# Patient Record
Sex: FEMALE | Race: ASIAN | NOT HISPANIC OR LATINO | URBAN - METROPOLITAN AREA
[De-identification: names, ages, dates, MRNs, and addresses within clinical notes are randomized per-mention and may not be internally consistent; named-entity substitution may affect disease eponyms.]

---

## 2019-09-06 ENCOUNTER — INPATIENT (INPATIENT)
Facility: HOSPITAL | Age: 79
LOS: 2 days | Discharge: HOME | End: 2019-09-09
Attending: INTERNAL MEDICINE | Admitting: INTERNAL MEDICINE
Payer: MEDICARE

## 2019-09-06 VITALS
RESPIRATION RATE: 18 BRPM | HEIGHT: 63 IN | OXYGEN SATURATION: 95 % | TEMPERATURE: 98 F | SYSTOLIC BLOOD PRESSURE: 140 MMHG | DIASTOLIC BLOOD PRESSURE: 92 MMHG | HEART RATE: 81 BPM | WEIGHT: 139.99 LBS

## 2019-09-06 DIAGNOSIS — E11.9 TYPE 2 DIABETES MELLITUS WITHOUT COMPLICATIONS: ICD-10-CM

## 2019-09-06 DIAGNOSIS — E78.5 HYPERLIPIDEMIA, UNSPECIFIED: ICD-10-CM

## 2019-09-06 DIAGNOSIS — Z29.9 ENCOUNTER FOR PROPHYLACTIC MEASURES, UNSPECIFIED: ICD-10-CM

## 2019-09-06 DIAGNOSIS — I10 ESSENTIAL (PRIMARY) HYPERTENSION: ICD-10-CM

## 2019-09-06 DIAGNOSIS — R07.9 CHEST PAIN, UNSPECIFIED: ICD-10-CM

## 2019-09-06 LAB
ALBUMIN SERPL ELPH-MCNC: 3.8 G/DL — SIGNIFICANT CHANGE UP (ref 3.3–5)
ALP SERPL-CCNC: 51 U/L — SIGNIFICANT CHANGE UP (ref 40–120)
ALT FLD-CCNC: 25 U/L — SIGNIFICANT CHANGE UP (ref 12–78)
ANION GAP SERPL CALC-SCNC: 10 MMOL/L — SIGNIFICANT CHANGE UP (ref 5–17)
APTT BLD: 27.7 SEC — SIGNIFICANT CHANGE UP (ref 27.5–36.3)
AST SERPL-CCNC: 18 U/L — SIGNIFICANT CHANGE UP (ref 15–37)
BASOPHILS # BLD AUTO: 0.02 K/UL — SIGNIFICANT CHANGE UP (ref 0–0.2)
BASOPHILS NFR BLD AUTO: 0.3 % — SIGNIFICANT CHANGE UP (ref 0–2)
BILIRUB SERPL-MCNC: 0.3 MG/DL — SIGNIFICANT CHANGE UP (ref 0.2–1.2)
BUN SERPL-MCNC: 11 MG/DL — SIGNIFICANT CHANGE UP (ref 7–23)
CALCIUM SERPL-MCNC: 8.7 MG/DL — SIGNIFICANT CHANGE UP (ref 8.5–10.1)
CHLORIDE SERPL-SCNC: 108 MMOL/L — SIGNIFICANT CHANGE UP (ref 96–108)
CO2 SERPL-SCNC: 25 MMOL/L — SIGNIFICANT CHANGE UP (ref 22–31)
CREAT SERPL-MCNC: 0.79 MG/DL — SIGNIFICANT CHANGE UP (ref 0.5–1.3)
EOSINOPHIL # BLD AUTO: 0.09 K/UL — SIGNIFICANT CHANGE UP (ref 0–0.5)
EOSINOPHIL NFR BLD AUTO: 1.4 % — SIGNIFICANT CHANGE UP (ref 0–6)
GLUCOSE BLDC GLUCOMTR-MCNC: 126 MG/DL — HIGH (ref 70–99)
GLUCOSE BLDC GLUCOMTR-MCNC: 138 MG/DL — HIGH (ref 70–99)
GLUCOSE BLDC GLUCOMTR-MCNC: 156 MG/DL — HIGH (ref 70–99)
GLUCOSE BLDC GLUCOMTR-MCNC: 272 MG/DL — HIGH (ref 70–99)
GLUCOSE SERPL-MCNC: 150 MG/DL — HIGH (ref 70–99)
HCT VFR BLD CALC: 36 % — SIGNIFICANT CHANGE UP (ref 34.5–45)
HGB BLD-MCNC: 10.9 G/DL — LOW (ref 11.5–15.5)
IMM GRANULOCYTES NFR BLD AUTO: 0.2 % — SIGNIFICANT CHANGE UP (ref 0–1.5)
INR BLD: 0.93 RATIO — SIGNIFICANT CHANGE UP (ref 0.88–1.16)
LYMPHOCYTES # BLD AUTO: 1.76 K/UL — SIGNIFICANT CHANGE UP (ref 1–3.3)
LYMPHOCYTES # BLD AUTO: 27.5 % — SIGNIFICANT CHANGE UP (ref 13–44)
MCHC RBC-ENTMCNC: 20.8 PG — LOW (ref 27–34)
MCHC RBC-ENTMCNC: 30.3 GM/DL — LOW (ref 32–36)
MCV RBC AUTO: 68.7 FL — LOW (ref 80–100)
MONOCYTES # BLD AUTO: 0.83 K/UL — SIGNIFICANT CHANGE UP (ref 0–0.9)
MONOCYTES NFR BLD AUTO: 13 % — SIGNIFICANT CHANGE UP (ref 2–14)
NEUTROPHILS # BLD AUTO: 3.68 K/UL — SIGNIFICANT CHANGE UP (ref 1.8–7.4)
NEUTROPHILS NFR BLD AUTO: 57.6 % — SIGNIFICANT CHANGE UP (ref 43–77)
NRBC # BLD: 0 /100 WBCS — SIGNIFICANT CHANGE UP (ref 0–0)
NT-PROBNP SERPL-SCNC: 112 PG/ML — SIGNIFICANT CHANGE UP (ref 0–450)
PLATELET # BLD AUTO: 156 K/UL — SIGNIFICANT CHANGE UP (ref 150–400)
POTASSIUM SERPL-MCNC: 3.9 MMOL/L — SIGNIFICANT CHANGE UP (ref 3.5–5.3)
POTASSIUM SERPL-SCNC: 3.9 MMOL/L — SIGNIFICANT CHANGE UP (ref 3.5–5.3)
PROT SERPL-MCNC: 7.4 GM/DL — SIGNIFICANT CHANGE UP (ref 6–8.3)
PROTHROM AB SERPL-ACNC: 10.4 SEC — SIGNIFICANT CHANGE UP (ref 10–12.9)
RBC # BLD: 5.24 M/UL — HIGH (ref 3.8–5.2)
RBC # FLD: 16.5 % — HIGH (ref 10.3–14.5)
SODIUM SERPL-SCNC: 143 MMOL/L — SIGNIFICANT CHANGE UP (ref 135–145)
TROPONIN I SERPL-MCNC: <.015 NG/ML — SIGNIFICANT CHANGE UP (ref 0.01–0.04)
WBC # BLD: 6.39 K/UL — SIGNIFICANT CHANGE UP (ref 3.8–10.5)
WBC # FLD AUTO: 6.39 K/UL — SIGNIFICANT CHANGE UP (ref 3.8–10.5)

## 2019-09-06 PROCEDURE — 99222 1ST HOSP IP/OBS MODERATE 55: CPT

## 2019-09-06 PROCEDURE — 93010 ELECTROCARDIOGRAM REPORT: CPT

## 2019-09-06 PROCEDURE — 99285 EMERGENCY DEPT VISIT HI MDM: CPT

## 2019-09-06 PROCEDURE — 71045 X-RAY EXAM CHEST 1 VIEW: CPT | Mod: 26

## 2019-09-06 PROCEDURE — 70450 CT HEAD/BRAIN W/O DYE: CPT | Mod: 26

## 2019-09-06 PROCEDURE — 12345: CPT | Mod: NC

## 2019-09-06 PROCEDURE — 93306 TTE W/DOPPLER COMPLETE: CPT | Mod: 26

## 2019-09-06 RX ORDER — SODIUM CHLORIDE 9 MG/ML
1000 INJECTION, SOLUTION INTRAVENOUS
Refills: 0 | Status: DISCONTINUED | OUTPATIENT
Start: 2019-09-06 | End: 2019-09-09

## 2019-09-06 RX ORDER — MECLIZINE HCL 12.5 MG
1 TABLET ORAL
Qty: 0 | Refills: 0 | DISCHARGE

## 2019-09-06 RX ORDER — ASPIRIN/CALCIUM CARB/MAGNESIUM 324 MG
325 TABLET ORAL ONCE
Refills: 0 | Status: COMPLETED | OUTPATIENT
Start: 2019-09-06 | End: 2019-09-06

## 2019-09-06 RX ORDER — SIMVASTATIN 20 MG/1
20 TABLET, FILM COATED ORAL AT BEDTIME
Refills: 0 | Status: DISCONTINUED | OUTPATIENT
Start: 2019-09-06 | End: 2019-09-09

## 2019-09-06 RX ORDER — DEXTROSE 50 % IN WATER 50 %
25 SYRINGE (ML) INTRAVENOUS ONCE
Refills: 0 | Status: DISCONTINUED | OUTPATIENT
Start: 2019-09-06 | End: 2019-09-09

## 2019-09-06 RX ORDER — GLUCAGON INJECTION, SOLUTION 0.5 MG/.1ML
1 INJECTION, SOLUTION SUBCUTANEOUS ONCE
Refills: 0 | Status: DISCONTINUED | OUTPATIENT
Start: 2019-09-06 | End: 2019-09-09

## 2019-09-06 RX ORDER — CLOPIDOGREL BISULFATE 75 MG/1
75 TABLET, FILM COATED ORAL DAILY
Refills: 0 | Status: DISCONTINUED | OUTPATIENT
Start: 2019-09-06 | End: 2019-09-09

## 2019-09-06 RX ORDER — LOSARTAN POTASSIUM 100 MG/1
100 TABLET, FILM COATED ORAL DAILY
Refills: 0 | Status: DISCONTINUED | OUTPATIENT
Start: 2019-09-06 | End: 2019-09-09

## 2019-09-06 RX ORDER — ACETAMINOPHEN 500 MG
650 TABLET ORAL EVERY 6 HOURS
Refills: 0 | Status: DISCONTINUED | OUTPATIENT
Start: 2019-09-06 | End: 2019-09-09

## 2019-09-06 RX ORDER — INFLUENZA VIRUS VACCINE 15; 15; 15; 15 UG/.5ML; UG/.5ML; UG/.5ML; UG/.5ML
0.5 SUSPENSION INTRAMUSCULAR ONCE
Refills: 0 | Status: COMPLETED | OUTPATIENT
Start: 2019-09-06 | End: 2019-09-09

## 2019-09-06 RX ORDER — HEPARIN SODIUM 5000 [USP'U]/ML
5000 INJECTION INTRAVENOUS; SUBCUTANEOUS EVERY 8 HOURS
Refills: 0 | Status: DISCONTINUED | OUTPATIENT
Start: 2019-09-06 | End: 2019-09-09

## 2019-09-06 RX ORDER — PANTOPRAZOLE SODIUM 20 MG/1
40 TABLET, DELAYED RELEASE ORAL
Refills: 0 | Status: DISCONTINUED | OUTPATIENT
Start: 2019-09-06 | End: 2019-09-09

## 2019-09-06 RX ORDER — NITROGLYCERIN 6.5 MG
0.4 CAPSULE, EXTENDED RELEASE ORAL
Refills: 0 | Status: DISCONTINUED | OUTPATIENT
Start: 2019-09-06 | End: 2019-09-09

## 2019-09-06 RX ORDER — MULTIVIT-MIN/FERROUS GLUCONATE 9 MG/15 ML
1 LIQUID (ML) ORAL DAILY
Refills: 0 | Status: DISCONTINUED | OUTPATIENT
Start: 2019-09-06 | End: 2019-09-09

## 2019-09-06 RX ORDER — FOLIC ACID 0.8 MG
1 TABLET ORAL DAILY
Refills: 0 | Status: DISCONTINUED | OUTPATIENT
Start: 2019-09-06 | End: 2019-09-09

## 2019-09-06 RX ORDER — INSULIN LISPRO 100/ML
VIAL (ML) SUBCUTANEOUS
Refills: 0 | Status: DISCONTINUED | OUTPATIENT
Start: 2019-09-06 | End: 2019-09-09

## 2019-09-06 RX ORDER — DEXTROSE 50 % IN WATER 50 %
15 SYRINGE (ML) INTRAVENOUS ONCE
Refills: 0 | Status: DISCONTINUED | OUTPATIENT
Start: 2019-09-06 | End: 2019-09-09

## 2019-09-06 RX ORDER — DEXTROSE 50 % IN WATER 50 %
12.5 SYRINGE (ML) INTRAVENOUS ONCE
Refills: 0 | Status: DISCONTINUED | OUTPATIENT
Start: 2019-09-06 | End: 2019-09-09

## 2019-09-06 RX ADMIN — HEPARIN SODIUM 5000 UNIT(S): 5000 INJECTION INTRAVENOUS; SUBCUTANEOUS at 06:47

## 2019-09-06 RX ADMIN — Medication 1 MILLIGRAM(S): at 11:42

## 2019-09-06 RX ADMIN — Medication 325 MILLIGRAM(S): at 06:47

## 2019-09-06 RX ADMIN — SIMVASTATIN 20 MILLIGRAM(S): 20 TABLET, FILM COATED ORAL at 23:31

## 2019-09-06 RX ADMIN — LOSARTAN POTASSIUM 100 MILLIGRAM(S): 100 TABLET, FILM COATED ORAL at 06:47

## 2019-09-06 RX ADMIN — Medication 1 TABLET(S): at 12:17

## 2019-09-06 RX ADMIN — HEPARIN SODIUM 5000 UNIT(S): 5000 INJECTION INTRAVENOUS; SUBCUTANEOUS at 23:31

## 2019-09-06 RX ADMIN — CLOPIDOGREL BISULFATE 75 MILLIGRAM(S): 75 TABLET, FILM COATED ORAL at 11:42

## 2019-09-06 RX ADMIN — PANTOPRAZOLE SODIUM 40 MILLIGRAM(S): 20 TABLET, DELAYED RELEASE ORAL at 06:47

## 2019-09-06 NOTE — CHART NOTE - NSCHARTNOTEFT_GEN_A_CORE
78 YO F with a PMH of GERD, HTN, DM, and HLD who presents to the ED for medical eval.  Patient speaks Cantonese and per , history taking is limited secondary to her accent.  Patient states that at home she has been having substernal chest pains that are associated with activity.  Pain feels like a pressure in her chest.  Patient also reports associated lightheadedness, states that she has been having these symptoms for some time.  ED MD states that patient reported having elevated BP at home with SBP ~ 190.  Patient states that he chest currently has some pain but not as severe.    Pt admitted by nocturnist and signed out to me this AM.  Agree with plan and current management.  Troponin neg x 3.  Keep on telemetry and follow TTE, cardiology rec.  Possible stress in AM.

## 2019-09-06 NOTE — H&P ADULT - NSHPLABSRESULTS_GEN_ALL_CORE
10.9   6.39  )-----------( 156      ( 06 Sep 2019 02:11 )             36.0     09-06    143  |  108  |  11  ----------------------------<  150<H>  3.9   |  25  |  0.79    Ca    8.7      06 Sep 2019 02:11    TPro  7.4  /  Alb  3.8  /  TBili  0.3  /  DBili  x   /  AST  18  /  ALT  25  /  AlkPhos  51  09-06    PT/INR - ( 06 Sep 2019 02:11 )   PT: 10.4 sec;   INR: 0.93 ratio         PTT - ( 06 Sep 2019 02:11 )  PTT:27.7 sec  LIVER FUNCTIONS - ( 06 Sep 2019 02:11 )  Alb: 3.8 g/dL / Pro: 7.4 gm/dL / ALK PHOS: 51 U/L / ALT: 25 U/L / AST: 18 U/L / GGT: x               Home Medications:  esomeprazole 40 mg oral delayed release capsule: 1 cap(s) orally once a day (06 Sep 2019 05:38)  folic acid 1 mg oral tablet: 1 tab(s) orally once a day (06 Sep 2019 05:38)  gabapentin 100 mg oral tablet:  (06 Sep 2019 05:38)  Januvia 25 mg oral tablet: 1 tab(s) orally once a day (06 Sep 2019 05:38)  losartan 100 mg oral tablet: 1 tab(s) orally once a day (06 Sep 2019 05:38)  meclizine 12.5 mg oral tablet: 1 tab(s) orally 3 times a day (06 Sep 2019 05:38)  metFORMIN 500 mg oral tablet: 1 tab(s) orally 2 times a day (06 Sep 2019 05:38)  Plavix 75 mg oral tablet: 1 tab(s) orally once a day (06 Sep 2019 05:38)  Prosight oral tablet: 1 tab(s) orally once a day (06 Sep 2019 05:38)  simvastatin 20 mg oral tablet: 1 tab(s) orally once a day (at bedtime) (06 Sep 2019 05:38)

## 2019-09-06 NOTE — H&P ADULT - PROBLEM SELECTOR PLAN 1
Oxygen 2L by nasal cannula PRN  Nitroglycerine 0.4mg po q5min x 3 doses maximum PRN for recurrent chest pain  ASA 325mg po then 81 mg po qd  Plavix 75  Simvastatin 20mg po qhs  Troponin level x 2 q 3 hrs  TTE   Consider cardio consult +- Stress testing in AM

## 2019-09-06 NOTE — ED ADULT NURSE NOTE - OBJECTIVE STATEMENT
pt received to bed 29 c/o hypertension, dizziness, chest pain, and headache starting  yesterday. denies: SOB, dizzy, abdominal pain, n/v/d. according to pt's son pt takes medication for HTN and has been compliant. on cardiac monitor at bedside. family at bedside.

## 2019-09-06 NOTE — ED ADULT NURSE NOTE - ED STAT RN HANDOFF DETAILS
Report endorsed to hold RN Arcelia. Safety checks compld this shift/Safety rounds completed hourly.  IV sites checked Q2+remains WDL. Fall +skin precs in place. Any issues endorsed to oncoming RN for follow up. on cardiac monitor at bedside. awaiting bed assignment.

## 2019-09-06 NOTE — ED PROVIDER NOTE - CARE PLAN
Principal Discharge DX:	Chest pain, unspecified type  Secondary Diagnosis:	Hypertension, unspecified type

## 2019-09-06 NOTE — ED PROVIDER NOTE - PHYSICAL EXAMINATION
Gen: Alert, NAD, well appearing  Head: NC, AT, PERRL, EOMI, normal lids/conjunctiva  ENT: normal hearing, patent oropharynx without erythema/exudate, uvula midline  Neck: +supple, no tenderness/meningismus/JVD, +Trachea midline  Pulm: Bilateral BS, normal resp effort, no wheeze/stridor/retractions  CV: RRR, no M/R/G, +dist pulses  Abd: soft, NT/ND, Negative [place] signs, +BS, no palpable masses  Mskel: no edema/erythema/cyanosis  Skin: no rash, warm/dry  Neuro: AAOx3, no apparent sensory/motor deficits, coordination intact

## 2019-09-06 NOTE — H&P ADULT - HISTORY OF PRESENT ILLNESS
Patient is an 78 YO F with a PMH of GERD, HTN, DM, and HLD who presents to the ED for medical eval.  Patient speaks Cantonese and per , history taking is limited secondary to her accent.  Patient states that at home she has been having substernal chest pains that are associated with activity.  Pain feels like a pressure in her chest.  Patient also reports associated lightheadedness, states taht she has been having these symptoms for some time.  ED MD states that patient reported having elevated BP at home with SBP ~ 190.  Patient states that he chest currently has some pain but not as severe.

## 2019-09-06 NOTE — CONSULT NOTE ADULT - SUBJECTIVE AND OBJECTIVE BOX
CHIEF COMPLAINT:  Patient is a 79y old  Female who presents with a chief complaint of chest pain (06 Sep 2019 05:55)      HPI:  Patient is an 80 YO F with a PMH of GERD, HTN, DM, and HLD who presents to the ED for medical eval.  Patient speaks Cantonese and per , history taking is limited secondary to her accent.  Patient states that at home she has been having substernal chest pains that are associated with activity.  Pain feels like a pressure in her chest.  Patient also reports associated lightheadedness, states taht she has been having these symptoms for some time.  ED MD states that patient reported having elevated BP at home with SBP ~ 190.  Patient states that he chest currently has some pain but not as severe.    ALLERGIES:  No Known Allergies    Home Medications:  esomeprazole 40 mg oral delayed release capsule: 1 cap(s) orally once a day (06 Sep 2019 05:38)  folic acid 1 mg oral tablet: 1 tab(s) orally once a day (06 Sep 2019 05:38)  gabapentin 100 mg oral tablet:  (06 Sep 2019 05:38)  Januvia 25 mg oral tablet: 1 tab(s) orally once a day (06 Sep 2019 05:38)  losartan 100 mg oral tablet: 1 tab(s) orally once a day (06 Sep 2019 05:38)  meclizine 12.5 mg oral tablet: 1 tab(s) orally 3 times a day (06 Sep 2019 05:38)  metFORMIN 500 mg oral tablet: 1 tab(s) orally 2 times a day (06 Sep 2019 05:38)  Plavix 75 mg oral tablet: 1 tab(s) orally once a day (06 Sep 2019 05:38)  Prosight oral tablet: 1 tab(s) orally once a day (06 Sep 2019 05:38)  simvastatin 20 mg oral tablet: 1 tab(s) orally once a day (at bedtime) (06 Sep 2019 05:38)      PAST MEDICAL & SURGICAL HISTORY:  DM (diabetes mellitus)  HTN (hypertension)      FAMILY HISTORY:  No pertinent family history in first degree relatives      SOCIAL HISTORY:    REVIEW OF SYSTEMS:  General:  No wt loss, fevers, chills, night sweats  Eyes:  Good vision, no reported pain  ENT:  No sore throat, pain, runny nose, dysphagia  CV:  No pain, palpitations, hypo/hypertension  Resp:  No dyspnea, cough, tachypnea, wheezing  GI:  No pain, nausea, vomiting, diarrhea, constipation  :  No pain, bleeding, incontinence, nocturia  Muscle:  No pain, weakness  Breast:  No pain, abscess, mass, discharge  Neuro:  No weakness, tingling, memory problems  Psych:  No fatigue, insomnia, mood problems, depression  Endocrine:  No polyuria, polydipsia, cold/heat intolerance  Heme:  No petechiae, ecchymosis, easy bruisability  Skin:  No rash, edema    PHYSICAL EXAM:  Vital Signs:  Vital Signs Last 24 Hrs  T(C): 37 (06 Sep 2019 11:46), Max: 37 (06 Sep 2019 08:32)  T(F): 98.6 (06 Sep 2019 11:46), Max: 98.6 (06 Sep 2019 08:32)  HR: 63 (06 Sep 2019 11:46) (59 - 81)  BP: 132/60 (06 Sep 2019 11:46) (122/56 - 169/81)  BP(mean): 78 (06 Sep 2019 07:25) (78 - 110)  RR: 17 (06 Sep 2019 11:46) (12 - 18)  SpO2: 97% (06 Sep 2019 11:46) (95% - 99%)  I&O's Summary      Tele:   General:  Appears stated age, well-groomed, well-nourished, no distress  HEENT:  NC/AT, patent nares w/ pink mucosa, OP clear w/o lesions, PERRL, EOMI, conjunctivae clear, no thyromegaly, nodules, adenopathy, no JVD  Chest:  Full & symmetric excursion, no increased effort, breath sounds clear  Cardiovascular:  Regular rhythm, S1, S2, no murmur/rub/S3/S4, no carotid/femoral/abdominal bruit, radial/pedal pulses 2+, no edema  Abdomen:  Soft, non-tender, non-distended, normoactive bowel sounds, no HSM  Extremities:  Gait & station:   Digits:   Nails:   Joints, Bones, Muscles:   ROM:   Stability:  Skin:  No rash/erythema/ecchymoses/petechiae/wounds/abscess/warm/dry  Musculoskeletal:  Full ROM in all joints w/o swelling/tenderness/effusion  Neuro/Psych:  Alert, oriented, normal and symmetric strength in UEs, LEs, normal gait, sensation intact, affect:   mood:   appearance:       LABORATORY:                          10.9   6.39  )-----------( 156      ( 06 Sep 2019 02:11 )             36.0     09-06    143  |  108  |  11  ----------------------------<  150<H>  3.9   |  25  |  0.79    Ca    8.7      06 Sep 2019 02:11    TPro  7.4  /  Alb  3.8  /  TBili  0.3  /  DBili  x   /  AST  18  /  ALT  25  /  AlkPhos  51  09-06      CARDIAC MARKERS ( 06 Sep 2019 10:10 )  <.015 ng/mL / x     / x     / x     / x      CARDIAC MARKERS ( 06 Sep 2019 07:36 )  <.015 ng/mL / x     / x     / x     / x      CARDIAC MARKERS ( 06 Sep 2019 02:11 )  <.015 ng/mL / x     / x     / x     / x          CAPILLARY BLOOD GLUCOSE  POCT Blood Glucose.: 272 mg/dL (06 Sep 2019 11:13)  POCT Blood Glucose.: 126 mg/dL (06 Sep 2019 07:46)    LIVER FUNCTIONS - ( 06 Sep 2019 02:11 )  Alb: 3.8 g/dL / Pro: 7.4 gm/dL / ALK PHOS: 51 U/L / ALT: 25 U/L / AST: 18 U/L / GGT: x           PT/INR - ( 06 Sep 2019 02:11 )   PT: 10.4 sec;   INR: 0.93 ratio         PTT - ( 06 Sep 2019 02:11 )  PTT:27.7 sec    BNPSerum Pro-Brain Natriuretic Peptide: 112 pg/mL (09-06-19 @ 02:11)      IMAGING:  < from: 12 Lead ECG (09.06.19 @ 03:25) >  Sinus bradycardia  Otherwise normal ECG  No previous ECGs available    < end of copied text >    < from: Xray Chest 1 View- PORTABLE-Urgent (09.06.19 @ 02:27) >  IMPRESSION:    Clear lungs.    < end of copied text >    < from: CT Head No Cont (09.06.19 @ 02:57) >    IMPRESSION:  No acute intracranial hemorrhage or mass effect vasogenic edema.    < end of copied text >      ASSESSMENT:  Patient is an 80 YO F with a PMH of GERD, HTN, DM, and HLD who presents to the ED for medical eval.  Patient speaks Cantonese and per , history taking is limited secondary to her accent.  Patient states that at home she has been having substernal chest pains that are associated with activity.  Pain feels like a pressure in her chest.  Patient also reports associated lightheadedness, states taht she has been having these symptoms for some time.  ED MD states that patient reported having elevated BP at home with SBP ~ 190.  Patient states that he chest currently has some pain but not as severe.    No clear evidence of ACS at present.    PLAN:     MEDICATIONS  (STANDING):  clopidogrel Tablet 75 milliGRAM(s) Oral daily  folic acid 1 milliGRAM(s) Oral daily  heparin  Injectable 5000 Unit(s) SubCutaneous every 8 hours  influenza   Vaccine 0.5 milliLiter(s) IntraMuscular once  insulin lispro (HumaLOG) corrective regimen sliding scale   SubCutaneous three times a day before meals  losartan 100 milliGRAM(s) Oral daily  multivitamin/minerals 1 Tablet(s) Oral daily  pantoprazole    Tablet 40 milliGRAM(s) Oral before breakfast  simvastatin 20 milliGRAM(s) Oral at bedtime    check echo.  tele monitor.  labs and vitals.  may need pharma nuclear stress to better risk stratify.    Michael Doan MD, FACC, NADEGE, MIRZA, FACP  Director, Heart Failure Services  [facility]  , Department of Cardiology  [facility] [facility] [employer] CHIEF COMPLAINT:  Patient is a 79y old  Female who presents with a chief complaint of chest pain (06 Sep 2019 05:55)      HPI:  Patient is an 80 YO F with GERD, HTN, DM, and HLD who presents to the ED for medical eval.  Had substernal chest pains that are associated with activity.  Pain felt like a pressure in her chest.  Patient also reported associated lightheadedness and having these symptoms for some time.  Elevated BP at home with SBP ~ 190.     ALLERGIES:  No Known Allergies    Home Medications:  esomeprazole 40 mg oral delayed release capsule: 1 cap(s) orally once a day (06 Sep 2019 05:38)  folic acid 1 mg oral tablet: 1 tab(s) orally once a day (06 Sep 2019 05:38)  gabapentin 100 mg oral tablet:  (06 Sep 2019 05:38)  Januvia 25 mg oral tablet: 1 tab(s) orally once a day (06 Sep 2019 05:38)  losartan 100 mg oral tablet: 1 tab(s) orally once a day (06 Sep 2019 05:38)  meclizine 12.5 mg oral tablet: 1 tab(s) orally 3 times a day (06 Sep 2019 05:38)  metFORMIN 500 mg oral tablet: 1 tab(s) orally 2 times a day (06 Sep 2019 05:38)  Plavix 75 mg oral tablet: 1 tab(s) orally once a day (06 Sep 2019 05:38)  Prosight oral tablet: 1 tab(s) orally once a day (06 Sep 2019 05:38)  simvastatin 20 mg oral tablet: 1 tab(s) orally once a day (at bedtime) (06 Sep 2019 05:38)      PAST MEDICAL & SURGICAL HISTORY:  DM (diabetes mellitus)  HTN (hypertension)      FAMILY HISTORY:  No pertinent family history in first degree relatives      SOCIAL HISTORY:  not reported.    REVIEW OF SYSTEMS:  Cp as noted otherwise grossly negative.    PHYSICAL EXAM:  Vital Signs:  Vital Signs Last 24 Hrs  T(C): 37 (06 Sep 2019 11:46), Max: 37 (06 Sep 2019 08:32)  T(F): 98.6 (06 Sep 2019 11:46), Max: 98.6 (06 Sep 2019 08:32)  HR: 63 (06 Sep 2019 11:46) (59 - 81)  BP: 132/60 (06 Sep 2019 11:46) (122/56 - 169/81)  BP(mean): 78 (06 Sep 2019 07:25) (78 - 110)  RR: 17 (06 Sep 2019 11:46) (12 - 18)  SpO2: 97% (06 Sep 2019 11:46) (95% - 99%)  I&O's Summary      Tele:   General:  well-nourished, no distress  HEENT:  NC/AT, patent nares w/ pink mucosa, OP clear w/o lesions, no thyromegaly, nodules, adenopathy, no JVD  Chest:  Full & symmetric excursion, no increased effort, breath sounds grossly clear  Cardiovascular:  Regular rhythm, S1, S2  Abdomen:  Soft, non-tender, non-distended  Extremities:  no edema    LABORATORY:                          10.9   6.39  )-----------( 156      ( 06 Sep 2019 02:11 )             36.0     09-06    143  |  108  |  11  ----------------------------<  150<H>  3.9   |  25  |  0.79    Ca    8.7      06 Sep 2019 02:11    TPro  7.4  /  Alb  3.8  /  TBili  0.3  /  DBili  x   /  AST  18  /  ALT  25  /  AlkPhos  51  09-06      CARDIAC MARKERS ( 06 Sep 2019 10:10 )  <.015 ng/mL / x     / x     / x     / x      CARDIAC MARKERS ( 06 Sep 2019 07:36 )  <.015 ng/mL / x     / x     / x     / x      CARDIAC MARKERS ( 06 Sep 2019 02:11 )  <.015 ng/mL / x     / x     / x     / x          CAPILLARY BLOOD GLUCOSE  POCT Blood Glucose.: 272 mg/dL (06 Sep 2019 11:13)  POCT Blood Glucose.: 126 mg/dL (06 Sep 2019 07:46)    LIVER FUNCTIONS - ( 06 Sep 2019 02:11 )  Alb: 3.8 g/dL / Pro: 7.4 gm/dL / ALK PHOS: 51 U/L / ALT: 25 U/L / AST: 18 U/L / GGT: x           PT/INR - ( 06 Sep 2019 02:11 )   PT: 10.4 sec;   INR: 0.93 ratio         PTT - ( 06 Sep 2019 02:11 )  PTT:27.7 sec    BNPSerum Pro-Brain Natriuretic Peptide: 112 pg/mL (09-06-19 @ 02:11)      IMAGING:  < from: 12 Lead ECG (09.06.19 @ 03:25) >  Sinus bradycardia  Otherwise normal ECG  No previous ECGs available    < end of copied text >    < from: Xray Chest 1 View- PORTABLE-Urgent (09.06.19 @ 02:27) >  IMPRESSION:    Clear lungs.    < end of copied text >    < from: CT Head No Cont (09.06.19 @ 02:57) >    IMPRESSION:  No acute intracranial hemorrhage or mass effect vasogenic edema.    < end of copied text >      ASSESSMENT:  Patient is an 80 YO F with GERD, HTN, DM, and HLD who presents to the ED for medical eval.  Had substernal chest pains that are associated with activity.  Pain felt like a pressure in her chest.  Patient also reported associated lightheadedness and having these symptoms for some time.  Elevated BP at home with SBP ~ 190.     No clear evidence of ACS at present.    PLAN:     MEDICATIONS  (STANDING):  clopidogrel Tablet 75 milliGRAM(s) Oral daily  folic acid 1 milliGRAM(s) Oral daily  heparin  Injectable 5000 Unit(s) SubCutaneous every 8 hours  influenza   Vaccine 0.5 milliLiter(s) IntraMuscular once  insulin lispro (HumaLOG) corrective regimen sliding scale   SubCutaneous three times a day before meals  losartan 100 milliGRAM(s) Oral daily  multivitamin/minerals 1 Tablet(s) Oral daily  pantoprazole    Tablet 40 milliGRAM(s) Oral before breakfast  simvastatin 20 milliGRAM(s) Oral at bedtime    check echo.  tele monitor.  labs and vitals.  may need pharma nuclear stress to better risk stratify.    Michael Doan MD, FACC, FASE, FASNC, FACP  Director, Heart Failure Services  [facility]  , Department of Cardiology  [facility]

## 2019-09-06 NOTE — ED PROVIDER NOTE - CLINICAL SUMMARY MEDICAL DECISION MAKING FREE TEXT BOX
Patient with chest pain, dizziness.  VSS.  Lab values reviewed, there are no values which require acute intervention. CT head unremarkable.  Patient continues to endorse both symptoms on reevalution.  BP currently improved. Patient is to be admitted to the hospital and the case was discussed with the admitting physician.  Any changes in plan, additional imaging/labs, and further work up will be at the discretion of the admitting physician.

## 2019-09-06 NOTE — ED ADULT NURSE NOTE - NS ED NURSE LEVEL OF CONSCIOUSNESS AFFECT
Spoke with Krysta Dooley regarding PA for Quetiapine. When researched online it looks like this drug is on preferred list- he advised that the dosage was more than they allow ( 3 pills a day max).       I spoke with pharmacist and advised how the pt was taking the
Appropriate/Calm

## 2019-09-06 NOTE — ED PROVIDER NOTE - OBJECTIVE STATEMENT
Pertinent PMH/PSH/FHx/SHx and Review of Systems contained within: ***Cantonese speaking, [employer]  [id number] used  Patient presents to the ED for hypertension and dizziness.  Brought in by son who checked patient's BP around midnight, it was elevated to 190's systolic.  Son unable to tell me which meds she takes but says that she is adherent, says that her BP has been consistently elevated in the evenings.  Patient started c/o dizziness around this time, and also mild chest discomfort.  Denies dyspnea, syncope, or leg swelling.      Relevant PMHx/SHx/SOCHx/FAMH:  HTN, HLD, DM, gastric bypass, vascular obstruction of some sort, on blood thinner, no h/o cancer, CVA, stents per son  Patient denies EtOH/tobacco/illicit substance use.    ROS: No fever/chills, No headache/photophobia/eye pain/changes in vision, No ear pain/sore throat/dysphagia, No ACTIVE chest pain/palpitations, no SOB/cough/wheeze/stridor, No abdominal pain, No N/V/D/melena, no dysuria/frequency/discharge, No neck/back pain, no rash, no changes in neurological status/function. Pertinent PMH/PSH/FHx/SHx and Review of Systems contained within: ***Cantonese speaking, [employer]  [id number] used  Patient presents to the ED for hypertension and dizziness.  Brought in by son who checked patient's BP around midnight, it was elevated to 190's systolic.  Son unable to tell me which meds she takes but says that she is adherent, says that her BP has been consistently elevated in the evenings.  Patient started c/o dizziness around this time, and also mild chest discomfort.  Denies dyspnea, syncope, or leg swelling.  Son is very limited historian regarding patient pmh.    Relevant PMHx/SHx/SOCHx/FAMH:  HTN, HLD, DM, gastric bypass, vascular obstruction of some sort, on blood thinner, no h/o cancer, CVA, stents per son  Patient denies EtOH/tobacco/illicit substance use.    ROS: No fever/chills, No headache/photophobia/eye pain/changes in vision, No ear pain/sore throat/dysphagia, No ACTIVE chest pain/palpitations, no SOB/cough/wheeze/stridor, No abdominal pain, No N/V/D/melena, no dysuria/frequency/discharge, No neck/back pain, no rash, no changes in neurological status/function.

## 2019-09-06 NOTE — H&P ADULT - ASSESSMENT
79F with DM, HTN presents to the ED for chest pain and elevated blood pressure.  Unable to get full history.  Labs benign  Will admit for BP control, cardio eval.    IMPROVE VTE Individual Risk Assessment          RISK                                                          Points  [  ] Previous VTE                                                3  [  ] Thrombophilia                                             2  [  ] Lower limb paralysis                                    2        (unable to hold up >15 seconds)    [  ] Current Cancer                                             2         (within 6 months)  [  ] Immobilization > 24 hrs                              1  [  ] ICU/CCU stay > 24 hours                            1  [x  ] Age > 60                                                    1    IMPROVE VTE Score _____1____

## 2019-09-07 DIAGNOSIS — R07.89 OTHER CHEST PAIN: ICD-10-CM

## 2019-09-07 DIAGNOSIS — I10 ESSENTIAL (PRIMARY) HYPERTENSION: ICD-10-CM

## 2019-09-07 LAB
ALBUMIN SERPL ELPH-MCNC: 4 G/DL — SIGNIFICANT CHANGE UP (ref 3.3–5)
ALP SERPL-CCNC: 56 U/L — SIGNIFICANT CHANGE UP (ref 40–120)
ALT FLD-CCNC: 31 U/L — SIGNIFICANT CHANGE UP (ref 12–78)
ANION GAP SERPL CALC-SCNC: 9 MMOL/L — SIGNIFICANT CHANGE UP (ref 5–17)
ANISOCYTOSIS BLD QL: SLIGHT — SIGNIFICANT CHANGE UP
AST SERPL-CCNC: 24 U/L — SIGNIFICANT CHANGE UP (ref 15–37)
BASOPHILS # BLD AUTO: 0.02 K/UL — SIGNIFICANT CHANGE UP (ref 0–0.2)
BASOPHILS NFR BLD AUTO: 0.3 % — SIGNIFICANT CHANGE UP (ref 0–2)
BILIRUB SERPL-MCNC: 0.6 MG/DL — SIGNIFICANT CHANGE UP (ref 0.2–1.2)
BUN SERPL-MCNC: 12 MG/DL — SIGNIFICANT CHANGE UP (ref 7–23)
CALCIUM SERPL-MCNC: 8.9 MG/DL — SIGNIFICANT CHANGE UP (ref 8.5–10.1)
CHLORIDE SERPL-SCNC: 103 MMOL/L — SIGNIFICANT CHANGE UP (ref 96–108)
CO2 SERPL-SCNC: 27 MMOL/L — SIGNIFICANT CHANGE UP (ref 22–31)
CREAT SERPL-MCNC: 0.69 MG/DL — SIGNIFICANT CHANGE UP (ref 0.5–1.3)
EOSINOPHIL # BLD AUTO: 0.06 K/UL — SIGNIFICANT CHANGE UP (ref 0–0.5)
EOSINOPHIL NFR BLD AUTO: 1 % — SIGNIFICANT CHANGE UP (ref 0–6)
GLUCOSE BLDC GLUCOMTR-MCNC: 119 MG/DL — HIGH (ref 70–99)
GLUCOSE BLDC GLUCOMTR-MCNC: 132 MG/DL — HIGH (ref 70–99)
GLUCOSE BLDC GLUCOMTR-MCNC: 159 MG/DL — HIGH (ref 70–99)
GLUCOSE BLDC GLUCOMTR-MCNC: 231 MG/DL — HIGH (ref 70–99)
GLUCOSE SERPL-MCNC: 120 MG/DL — HIGH (ref 70–99)
HBA1C BLD-MCNC: 7.8 % — HIGH (ref 4–5.6)
HCT VFR BLD CALC: 41.5 % — SIGNIFICANT CHANGE UP (ref 34.5–45)
HGB BLD-MCNC: 12.4 G/DL — SIGNIFICANT CHANGE UP (ref 11.5–15.5)
IMM GRANULOCYTES NFR BLD AUTO: 0.2 % — SIGNIFICANT CHANGE UP (ref 0–1.5)
LYMPHOCYTES # BLD AUTO: 2.71 K/UL — SIGNIFICANT CHANGE UP (ref 1–3.3)
LYMPHOCYTES # BLD AUTO: 45.9 % — HIGH (ref 13–44)
MACROCYTES BLD QL: SLIGHT — SIGNIFICANT CHANGE UP
MANUAL SMEAR VERIFICATION: SIGNIFICANT CHANGE UP
MCHC RBC-ENTMCNC: 20.6 PG — LOW (ref 27–34)
MCHC RBC-ENTMCNC: 29.9 GM/DL — LOW (ref 32–36)
MCV RBC AUTO: 68.9 FL — LOW (ref 80–100)
MICROCYTES BLD QL: SLIGHT — SIGNIFICANT CHANGE UP
MONOCYTES # BLD AUTO: 0.65 K/UL — SIGNIFICANT CHANGE UP (ref 0–0.9)
MONOCYTES NFR BLD AUTO: 11 % — SIGNIFICANT CHANGE UP (ref 2–14)
NEUTROPHILS # BLD AUTO: 2.45 K/UL — SIGNIFICANT CHANGE UP (ref 1.8–7.4)
NEUTROPHILS NFR BLD AUTO: 41.6 % — LOW (ref 43–77)
NRBC # BLD: 0 /100 WBCS — SIGNIFICANT CHANGE UP (ref 0–0)
OVALOCYTES BLD QL SMEAR: SLIGHT — SIGNIFICANT CHANGE UP
PLAT MORPH BLD: NORMAL — SIGNIFICANT CHANGE UP
PLATELET # BLD AUTO: 173 K/UL — SIGNIFICANT CHANGE UP (ref 150–400)
POIKILOCYTOSIS BLD QL AUTO: SLIGHT — SIGNIFICANT CHANGE UP
POTASSIUM SERPL-MCNC: 3.6 MMOL/L — SIGNIFICANT CHANGE UP (ref 3.5–5.3)
POTASSIUM SERPL-SCNC: 3.6 MMOL/L — SIGNIFICANT CHANGE UP (ref 3.5–5.3)
PROT SERPL-MCNC: 8.3 GM/DL — SIGNIFICANT CHANGE UP (ref 6–8.3)
RBC # BLD: 6.02 M/UL — HIGH (ref 3.8–5.2)
RBC # FLD: 17.6 % — HIGH (ref 10.3–14.5)
RBC BLD AUTO: SIGNIFICANT CHANGE UP
SODIUM SERPL-SCNC: 139 MMOL/L — SIGNIFICANT CHANGE UP (ref 135–145)
WBC # BLD: 5.9 K/UL — SIGNIFICANT CHANGE UP (ref 3.8–10.5)
WBC # FLD AUTO: 5.9 K/UL — SIGNIFICANT CHANGE UP (ref 3.8–10.5)

## 2019-09-07 PROCEDURE — 99233 SBSQ HOSP IP/OBS HIGH 50: CPT

## 2019-09-07 PROCEDURE — 99232 SBSQ HOSP IP/OBS MODERATE 35: CPT

## 2019-09-07 RX ORDER — REGADENOSON 0.08 MG/ML
0.4 INJECTION, SOLUTION INTRAVENOUS ONCE
Refills: 0 | Status: COMPLETED | OUTPATIENT
Start: 2019-09-07 | End: 2019-09-09

## 2019-09-07 RX ORDER — ASPIRIN/CALCIUM CARB/MAGNESIUM 324 MG
81 TABLET ORAL DAILY
Refills: 0 | Status: DISCONTINUED | OUTPATIENT
Start: 2019-09-07 | End: 2019-09-09

## 2019-09-07 RX ADMIN — Medication 1 MILLIGRAM(S): at 12:30

## 2019-09-07 RX ADMIN — LOSARTAN POTASSIUM 100 MILLIGRAM(S): 100 TABLET, FILM COATED ORAL at 05:33

## 2019-09-07 RX ADMIN — HEPARIN SODIUM 5000 UNIT(S): 5000 INJECTION INTRAVENOUS; SUBCUTANEOUS at 15:54

## 2019-09-07 RX ADMIN — Medication 1 TABLET(S): at 12:30

## 2019-09-07 RX ADMIN — CLOPIDOGREL BISULFATE 75 MILLIGRAM(S): 75 TABLET, FILM COATED ORAL at 12:30

## 2019-09-07 RX ADMIN — Medication 2: at 17:19

## 2019-09-07 RX ADMIN — Medication 1: at 12:28

## 2019-09-07 RX ADMIN — HEPARIN SODIUM 5000 UNIT(S): 5000 INJECTION INTRAVENOUS; SUBCUTANEOUS at 22:25

## 2019-09-07 RX ADMIN — SIMVASTATIN 20 MILLIGRAM(S): 20 TABLET, FILM COATED ORAL at 22:25

## 2019-09-07 RX ADMIN — PANTOPRAZOLE SODIUM 40 MILLIGRAM(S): 20 TABLET, DELAYED RELEASE ORAL at 06:12

## 2019-09-07 RX ADMIN — HEPARIN SODIUM 5000 UNIT(S): 5000 INJECTION INTRAVENOUS; SUBCUTANEOUS at 05:33

## 2019-09-07 NOTE — PROGRESS NOTE ADULT - PROBLEM SELECTOR PLAN 1
Telemetry monitoring.  CE neg x 3  Aspirin daily  Plavix 75mg po qdaily.  TTE & Stress test  Cardiology consult appreciated

## 2019-09-07 NOTE — PROGRESS NOTE ADULT - SUBJECTIVE AND OBJECTIVE BOX
Patient is a 79y old  Female who presents with a chief complaint of chest pain.    Overnight:  No events, no complaints.      REVIEW OF SYSTEMS:  No new complaints    MEDICATIONS  (STANDING):  clopidogrel Tablet 75 milliGRAM(s) Oral daily  dextrose 5%. 1000 milliLiter(s) (50 mL/Hr) IV Continuous <Continuous>  dextrose 50% Injectable 12.5 Gram(s) IV Push once  dextrose 50% Injectable 25 Gram(s) IV Push once  dextrose 50% Injectable 25 Gram(s) IV Push once  folic acid 1 milliGRAM(s) Oral daily  heparin  Injectable 5000 Unit(s) SubCutaneous every 8 hours  influenza   Vaccine 0.5 milliLiter(s) IntraMuscular once  insulin lispro (HumaLOG) corrective regimen sliding scale   SubCutaneous three times a day before meals  losartan 100 milliGRAM(s) Oral daily  multivitamin/minerals 1 Tablet(s) Oral daily  pantoprazole    Tablet 40 milliGRAM(s) Oral before breakfast  regadenoson Injectable 0.4 milliGRAM(s) IV Push once  simvastatin 20 milliGRAM(s) Oral at bedtime    MEDICATIONS  (PRN):  acetaminophen   Tablet .. 650 milliGRAM(s) Oral every 6 hours PRN Mild Pain (1 - 3)  dextrose 40% Gel 15 Gram(s) Oral once PRN Blood Glucose LESS THAN 70 milliGRAM(s)/deciliter  glucagon  Injectable 1 milliGRAM(s) IntraMuscular once PRN Glucose LESS THAN 70 milligrams/deciliter  nitroglycerin     SubLingual 0.4 milliGRAM(s) SubLingual every 5 minutes PRN Chest Pain      Allergies    No Known Allergies      Vital Signs Last 24 Hrs  T(C): 36.4 (07 Sep 2019 11:23), Max: 37 (06 Sep 2019 16:19)  T(F): 97.5 (07 Sep 2019 11:23), Max: 98.6 (06 Sep 2019 16:19)  HR: 53 (07 Sep 2019 11:23) (53 - 59)  BP: 117/69 (07 Sep 2019 11:23) (117/69 - 135/64)  BP(mean): --  RR: 18 (07 Sep 2019 11:23) (18 - 18)  SpO2: 95% (07 Sep 2019 11:23) (95% - 97%)    PHYSICAL EXAM:    GENERAL: NAD, well-groomed, well-developed  HEAD:  Atraumatic, Normocephalic  EYES: EOMI, PERRLA, conjunctiva and sclera clear  ENMT: No tonsillar erythema, exudates, or enlargement; Moist mucous membranes, Good dentition, No lesions  NECK: Supple, No JVD, Normal thyroid  CHEST/LUNG: Clear bilaterally; No rales, rhonchi, wheezing, or rubs  HEART: Regular rate and rhythm; No murmurs, rubs, or gallops  ABDOMEN: Soft, Nontender, Nondistended; Bowel sounds present  EXTREMITIES:  2+ Peripheral Pulses, No clubbing, cyanosis, or edema      LABS:                        12.4   5.90  )-----------( 173      ( 07 Sep 2019 06:53 )             41.5     09-07    139  |  103  |  12  ----------------------------<  120<H>  3.6   |  27  |  0.69    Ca    8.9      07 Sep 2019 06:53    TPro  8.3  /  Alb  4.0  /  TBili  0.6  /  DBili  x   /  AST  24  /  ALT  31  /  AlkPhos  56  09-07    PT/INR - ( 06 Sep 2019 02:11 )   PT: 10.4 sec;   INR: 0.93 ratio         PTT - ( 06 Sep 2019 02:11 )  PTT:27.7 sec      RADIOLOGY & ADDITIONAL STUDIES:

## 2019-09-07 NOTE — PROGRESS NOTE ADULT - SUBJECTIVE AND OBJECTIVE BOX
CHIEF COMPLAINT:  Patient is a 79y old  Female who presents with a chief complaint of chest pain (06 Sep 2019 05:55)      HPI:  Patient is an 80 YO F with GERD, HTN, DM, and HLD who presents to the ED for medical eval.  Had substernal chest pains that are associated with activity.  Pain felt like a pressure in her chest.  Patient also reported associated lightheadedness and having these symptoms for some time.  Elevated BP at home with SBP ~ 190. Awake and feels ok when seen resting in bed earlier today.      REVIEW OF SYSTEMS:  Cp as noted otherwise grossly negative.    PHYSICAL EXAM:  Vital Signs Last 24 Hrs  T(C): 36.4 (07 Sep 2019 11:23), Max: 37 (06 Sep 2019 16:19)  T(F): 97.5 (07 Sep 2019 11:23), Max: 98.6 (06 Sep 2019 16:19)  HR: 53 (07 Sep 2019 11:23) (53 - 59)  BP: 117/69 (07 Sep 2019 11:23) (117/69 - 135/64)  RR: 18 (07 Sep 2019 11:23) (18 - 18)  SpO2: 95% (07 Sep 2019 11:23) (95% - 97%)    Tele: SR/sinus bradycardia  General:  well-nourished, no distress  HEENT:  NC/AT, patent nares w/ pink mucosa, OP clear w/o lesions, no thyromegaly, nodules, adenopathy, no JVD  Chest:  Full & symmetric excursion, no increased effort, breath sounds grossly clear  Cardiovascular:  Regular rhythm, S1, S2  Abdomen:  Soft, non-tender, non-distended  Extremities:  no edema    LABORATORY:                        12.4   5.90  )-----------( 173      ( 07 Sep 2019 06:53 )             41.5     09-07    139  |  103  |  12  ----------------------------<  120<H>  3.6   |  27  |  0.69    Ca    8.9      07 Sep 2019 06:53    TPro  8.3  /  Alb  4.0  /  TBili  0.6  /  DBili  x   /  AST  24  /  ALT  31  /  AlkPhos  56  09-07      CARDIAC MARKERS ( 06 Sep 2019 10:10 )  <.015 ng/mL / x     / x     / x     / x      CARDIAC MARKERS ( 06 Sep 2019 07:36 )  <.015 ng/mL / x     / x     / x     / x      CARDIAC MARKERS ( 06 Sep 2019 02:11 )  <.015 ng/mL / x     / x     / x     / x          CAPILLARY BLOOD GLUCOSE  POCT Blood Glucose.: 272 mg/dL (06 Sep 2019 11:13)  POCT Blood Glucose.: 126 mg/dL (06 Sep 2019 07:46)    LIVER FUNCTIONS - ( 06 Sep 2019 02:11 )  Alb: 3.8 g/dL / Pro: 7.4 gm/dL / ALK PHOS: 51 U/L / ALT: 25 U/L / AST: 18 U/L / GGT: x           PT/INR - ( 06 Sep 2019 02:11 )   PT: 10.4 sec;   INR: 0.93 ratio         PTT - ( 06 Sep 2019 02:11 )  PTT:27.7 sec    BNPSerum Pro-Brain Natriuretic Peptide: 112 pg/mL (09-06-19 @ 02:11)      IMAGING:  < from: 12 Lead ECG (09.06.19 @ 03:25) >  Sinus bradycardia  Otherwise normal ECG  No previous ECGs available    < end of copied text >    < from: Xray Chest 1 View- PORTABLE-Urgent (09.06.19 @ 02:27) >  IMPRESSION:    Clear lungs.    < end of copied text >    < from: CT Head No Cont (09.06.19 @ 02:57) >    IMPRESSION:  No acute intracranial hemorrhage or mass effect vasogenic edema.    < end of copied text >      ASSESSMENT:  Patient is an 80 YO F with GERD, HTN, DM, and HLD who presents to the ED for medical eval.  Had substernal chest pains that are associated with activity.  Pain felt like a pressure in her chest.  Patient also reported associated lightheadedness and having these symptoms for some time.  Elevated BP at home with SBP ~ 190.  Awake and feels ok when seen resting in bed earlier today. Stable labs, vitals, and clinical exam without evidence of ACS now.    PLAN:       Cardiac risk stratify with echo & nuclear pharma stress test.    MEDICATIONS  (STANDING):  clopidogrel Tablet 75 milliGRAM(s) Oral daily  folic acid 1 milliGRAM(s) Oral daily  heparin  Injectable 5000 Unit(s) SubCutaneous every 8 hours  influenza   Vaccine 0.5 milliLiter(s) IntraMuscular once  insulin lispro (HumaLOG) corrective regimen sliding scale   SubCutaneous three times a day before meals  losartan 100 milliGRAM(s) Oral daily  multivitamin/minerals 1 Tablet(s) Oral daily  pantoprazole    Tablet 40 milliGRAM(s) Oral before breakfast  simvastatin 20 milliGRAM(s) Oral at bedtime    Michael Doan MD, FACC, FASE, FASNC, FACP  Director, Heart Failure Services  [facility]  , Department of Cardiology  [facility]

## 2019-09-08 LAB
ANION GAP SERPL CALC-SCNC: 8 MMOL/L — SIGNIFICANT CHANGE UP (ref 5–17)
BASOPHILS # BLD AUTO: 0.03 K/UL — SIGNIFICANT CHANGE UP (ref 0–0.2)
BASOPHILS NFR BLD AUTO: 0.6 % — SIGNIFICANT CHANGE UP (ref 0–2)
BUN SERPL-MCNC: 14 MG/DL — SIGNIFICANT CHANGE UP (ref 7–23)
CALCIUM SERPL-MCNC: 8.4 MG/DL — LOW (ref 8.5–10.1)
CHLORIDE SERPL-SCNC: 108 MMOL/L — SIGNIFICANT CHANGE UP (ref 96–108)
CO2 SERPL-SCNC: 24 MMOL/L — SIGNIFICANT CHANGE UP (ref 22–31)
CREAT SERPL-MCNC: 0.67 MG/DL — SIGNIFICANT CHANGE UP (ref 0.5–1.3)
EOSINOPHIL # BLD AUTO: 0.09 K/UL — SIGNIFICANT CHANGE UP (ref 0–0.5)
EOSINOPHIL NFR BLD AUTO: 1.9 % — SIGNIFICANT CHANGE UP (ref 0–6)
GLUCOSE BLDC GLUCOMTR-MCNC: 138 MG/DL — HIGH (ref 70–99)
GLUCOSE BLDC GLUCOMTR-MCNC: 175 MG/DL — HIGH (ref 70–99)
GLUCOSE BLDC GLUCOMTR-MCNC: 205 MG/DL — HIGH (ref 70–99)
GLUCOSE BLDC GLUCOMTR-MCNC: 218 MG/DL — HIGH (ref 70–99)
GLUCOSE SERPL-MCNC: 130 MG/DL — HIGH (ref 70–99)
HCT VFR BLD CALC: 34.3 % — LOW (ref 34.5–45)
HGB BLD-MCNC: 10.6 G/DL — LOW (ref 11.5–15.5)
IMM GRANULOCYTES NFR BLD AUTO: 0.2 % — SIGNIFICANT CHANGE UP (ref 0–1.5)
LYMPHOCYTES # BLD AUTO: 2.09 K/UL — SIGNIFICANT CHANGE UP (ref 1–3.3)
LYMPHOCYTES # BLD AUTO: 43.3 % — SIGNIFICANT CHANGE UP (ref 13–44)
MCHC RBC-ENTMCNC: 20.9 PG — LOW (ref 27–34)
MCHC RBC-ENTMCNC: 30.9 GM/DL — LOW (ref 32–36)
MCV RBC AUTO: 67.8 FL — LOW (ref 80–100)
MONOCYTES # BLD AUTO: 0.63 K/UL — SIGNIFICANT CHANGE UP (ref 0–0.9)
MONOCYTES NFR BLD AUTO: 13 % — SIGNIFICANT CHANGE UP (ref 2–14)
NEUTROPHILS # BLD AUTO: 1.98 K/UL — SIGNIFICANT CHANGE UP (ref 1.8–7.4)
NEUTROPHILS NFR BLD AUTO: 41 % — LOW (ref 43–77)
NRBC # BLD: 0 /100 WBCS — SIGNIFICANT CHANGE UP (ref 0–0)
PLATELET # BLD AUTO: 169 K/UL — SIGNIFICANT CHANGE UP (ref 150–400)
POTASSIUM SERPL-MCNC: 4.2 MMOL/L — SIGNIFICANT CHANGE UP (ref 3.5–5.3)
POTASSIUM SERPL-SCNC: 4.2 MMOL/L — SIGNIFICANT CHANGE UP (ref 3.5–5.3)
RBC # BLD: 5.06 M/UL — SIGNIFICANT CHANGE UP (ref 3.8–5.2)
RBC # FLD: 16.3 % — HIGH (ref 10.3–14.5)
SODIUM SERPL-SCNC: 140 MMOL/L — SIGNIFICANT CHANGE UP (ref 135–145)
WBC # BLD: 4.83 K/UL — SIGNIFICANT CHANGE UP (ref 3.8–10.5)
WBC # FLD AUTO: 4.83 K/UL — SIGNIFICANT CHANGE UP (ref 3.8–10.5)

## 2019-09-08 PROCEDURE — 99233 SBSQ HOSP IP/OBS HIGH 50: CPT

## 2019-09-08 RX ADMIN — Medication 81 MILLIGRAM(S): at 13:23

## 2019-09-08 RX ADMIN — Medication 1 MILLIGRAM(S): at 12:58

## 2019-09-08 RX ADMIN — LOSARTAN POTASSIUM 100 MILLIGRAM(S): 100 TABLET, FILM COATED ORAL at 06:04

## 2019-09-08 RX ADMIN — HEPARIN SODIUM 5000 UNIT(S): 5000 INJECTION INTRAVENOUS; SUBCUTANEOUS at 17:23

## 2019-09-08 RX ADMIN — HEPARIN SODIUM 5000 UNIT(S): 5000 INJECTION INTRAVENOUS; SUBCUTANEOUS at 22:27

## 2019-09-08 RX ADMIN — Medication 2: at 17:20

## 2019-09-08 RX ADMIN — CLOPIDOGREL BISULFATE 75 MILLIGRAM(S): 75 TABLET, FILM COATED ORAL at 12:58

## 2019-09-08 RX ADMIN — SIMVASTATIN 20 MILLIGRAM(S): 20 TABLET, FILM COATED ORAL at 22:27

## 2019-09-08 RX ADMIN — Medication 2: at 11:12

## 2019-09-08 RX ADMIN — HEPARIN SODIUM 5000 UNIT(S): 5000 INJECTION INTRAVENOUS; SUBCUTANEOUS at 06:05

## 2019-09-08 RX ADMIN — Medication 1 TABLET(S): at 12:58

## 2019-09-08 RX ADMIN — PANTOPRAZOLE SODIUM 40 MILLIGRAM(S): 20 TABLET, DELAYED RELEASE ORAL at 06:04

## 2019-09-08 NOTE — PROGRESS NOTE ADULT - SUBJECTIVE AND OBJECTIVE BOX
Patient is a 79y old  Female who presents with a chief complaint of chest pain.    Overnight:  No events, no complaints.      REVIEW OF SYSTEMS:  No new complaints.      MEDICATIONS  (STANDING):  aspirin  chewable 81 milliGRAM(s) Oral daily  clopidogrel Tablet 75 milliGRAM(s) Oral daily  dextrose 5%. 1000 milliLiter(s) (50 mL/Hr) IV Continuous <Continuous>  dextrose 50% Injectable 12.5 Gram(s) IV Push once  dextrose 50% Injectable 25 Gram(s) IV Push once  dextrose 50% Injectable 25 Gram(s) IV Push once  folic acid 1 milliGRAM(s) Oral daily  heparin  Injectable 5000 Unit(s) SubCutaneous every 8 hours  influenza   Vaccine 0.5 milliLiter(s) IntraMuscular once  insulin lispro (HumaLOG) corrective regimen sliding scale   SubCutaneous three times a day before meals  losartan 100 milliGRAM(s) Oral daily  multivitamin/minerals 1 Tablet(s) Oral daily  pantoprazole    Tablet 40 milliGRAM(s) Oral before breakfast  regadenoson Injectable 0.4 milliGRAM(s) IV Push once  simvastatin 20 milliGRAM(s) Oral at bedtime    MEDICATIONS  (PRN):  acetaminophen   Tablet .. 650 milliGRAM(s) Oral every 6 hours PRN Mild Pain (1 - 3)  dextrose 40% Gel 15 Gram(s) Oral once PRN Blood Glucose LESS THAN 70 milliGRAM(s)/deciliter  glucagon  Injectable 1 milliGRAM(s) IntraMuscular once PRN Glucose LESS THAN 70 milligrams/deciliter  nitroglycerin     SubLingual 0.4 milliGRAM(s) SubLingual every 5 minutes PRN Chest Pain      Allergies    No Known Allergies        Vital Signs Last 24 Hrs  T(C): 36.1 (08 Sep 2019 12:15), Max: 36.7 (07 Sep 2019 23:20)  T(F): 97 (08 Sep 2019 12:15), Max: 98.1 (07 Sep 2019 23:20)  HR: 51 (08 Sep 2019 12:15) (50 - 56)  BP: 111/60 (08 Sep 2019 12:15) (111/60 - 132/77)  BP(mean): --  RR: 16 (08 Sep 2019 12:15) (16 - 18)  SpO2: 98% (08 Sep 2019 12:15) (97% - 99%)    PHYSICAL EXAM:  GENERAL: NAD, well-groomed, well-developed  HEAD:  Atraumatic, Normocephalic  EYES: EOMI, PERRLA, conjunctiva and sclera clear  ENMT: No tonsillar erythema, exudates, or enlargement; Moist mucous membranes, Good dentition, No lesions  NECK: Supple, No JVD, Normal thyroid  CHEST/LUNG: Clear bilaterally; No rales, rhonchi, wheezing, or rubs  HEART: Regular rate and rhythm; No murmurs, rubs, or gallops  ABDOMEN: Soft, Nontender, Nondistended; Bowel sounds present  EXTREMITIES:  2+ Peripheral Pulses, No clubbing, cyanosis, or edema      LABS:                        10.6   4.83  )-----------( 169      ( 08 Sep 2019 06:18 )             34.3     09-08    140  |  108  |  14  ----------------------------<  130<H>  4.2   |  24  |  0.67    Ca    8.4<L>      08 Sep 2019 06:18    TPro  8.3  /  Alb  4.0  /  TBili  0.6  /  DBili  x   /  AST  24  /  ALT  31  /  AlkPhos  56  09-07

## 2019-09-08 NOTE — PROGRESS NOTE ADULT - SUBJECTIVE AND OBJECTIVE BOX
HPI:  Patient is an 80 YO F with GERD, HTN, DM, and HLD who presents to the ED for medical eval.  Had substernal chest pains that are associated with activity.  Pain felt like a pressure in her chest.  Patient also reported associated lightheadedness and having these symptoms for some time.  Elevated BP at home with SBP ~ 190. Awake and feels ok when seen resting in bed earlier today. Ambulating and feeling better without cp today. BP better.      REVIEW OF SYSTEMS:  Cp as noted otherwise grossly negative.    PHYSICAL EXAM:  Vital Signs Last 24 Hrs  T(C): 36.9 (08 Sep 2019 16:25), Max: 36.9 (08 Sep 2019 16:25)  T(F): 98.4 (08 Sep 2019 16:25), Max: 98.4 (08 Sep 2019 16:25)  HR: 55 (08 Sep 2019 16:25) (50 - 55)  BP: 127/56 (08 Sep 2019 16:25) (111/60 - 132/77)  RR: 18 (08 Sep 2019 16:25) (16 - 18)  SpO2: 96% (08 Sep 2019 16:25) (96% - 99%)    Tele: SR/sinus bradycardia  General:  well-nourished, no distress  HEENT:  NC/AT, patent nares w/ pink mucosa, OP clear w/o lesions, no thyromegaly, nodules, adenopathy, no JVD  Chest:  Full & symmetric excursion, no increased effort, breath sounds grossly clear  Cardiovascular:  Regular rhythm, S1, S2  Abdomen:  Soft, non-tender, non-distended  Extremities:  no edema    LABORATORY:                        10.6   4.83  )-----------( 169      ( 08 Sep 2019 06:18 )             34.3     09-08    140  |  108  |  14  ----------------------------<  130<H>  4.2   |  24  |  0.67    Ca    8.4<L>      08 Sep 2019 06:18    TPro  8.3  /  Alb  4.0  /  TBili  0.6  /  DBili  x   /  AST  24  /  ALT  31  /  AlkPhos  56  09-07        CARDIAC MARKERS ( 06 Sep 2019 10:10 )  <.015 ng/mL / x     / x     / x     / x      CARDIAC MARKERS ( 06 Sep 2019 07:36 )  <.015 ng/mL / x     / x     / x     / x      CARDIAC MARKERS ( 06 Sep 2019 02:11 )  <.015 ng/mL / x     / x     / x     / x          CAPILLARY BLOOD GLUCOSE  POCT Blood Glucose.: 272 mg/dL (06 Sep 2019 11:13)  POCT Blood Glucose.: 126 mg/dL (06 Sep 2019 07:46)    LIVER FUNCTIONS - ( 06 Sep 2019 02:11 )  Alb: 3.8 g/dL / Pro: 7.4 gm/dL / ALK PHOS: 51 U/L / ALT: 25 U/L / AST: 18 U/L / GGT: x           PT/INR - ( 06 Sep 2019 02:11 )   PT: 10.4 sec;   INR: 0.93 ratio         PTT - ( 06 Sep 2019 02:11 )  PTT:27.7 sec    BNPSerum Pro-Brain Natriuretic Peptide: 112 pg/mL (09-06-19 @ 02:11)      IMAGING:  < from: 12 Lead ECG (09.06.19 @ 03:25) >  Sinus bradycardia  Otherwise normal ECG  No previous ECGs available    < end of copied text >    < from: Xray Chest 1 View- PORTABLE-Urgent (09.06.19 @ 02:27) >  IMPRESSION:    Clear lungs.    < end of copied text >    < from: CT Head No Cont (09.06.19 @ 02:57) >    IMPRESSION:  No acute intracranial hemorrhage or mass effect vasogenic edema.    < end of copied text >    < from: TTE Echo Doppler w/o Cont (09.06.19 @ 14:35) >  Summary:   1. Left ventricular ejection fraction, by visual estimation, is 60 to   65%.   2. Technically good study.   3. Normal global left ventricular systolic function.   4. Normal left ventricular internal cavity size.   5. Spectral Doppler shows impaired relaxation pattern of left   ventricular myocardial filling (Grade I diastolic dysfunction).   6. Normal right ventricular size and function.   7. There is noevidence of pericardial effusion.   8. Mild thickening and calcification of the anterior and posterior   mitral valve leaflets.   9. Mild aortic regurgitation.  10. Estimated pulmonary artery systolic pressure is 36.7 mmHg assuming a   right atrial pressure of 5 mmHg, which is consistent with borderline   pulmonary hypertension.    < end of copied text >    ASSESSMENT:  Patient is an 80 YO F with GERD, HTN, DM, and HLD who presents to the ED for medical eval.  Had substernal chest pains that are associated with activity.  Pain felt like a pressure in her chest.  Patient also reported associated lightheadedness and having these symptoms for some time.  Elevated BP at home with SBP ~ 190.  Awake and feels ok when seen resting in bed earlier today. Stable labs, vitals, and clinical exam without evidence of ACS now.  Ambulating and feeling better without cp today. BP better.  d/w pt's dtr-in-law and family at bedside. Agree with further cardiac w/u.    PLAN:       MEDICATIONS  (STANDING):  aspirin  chewable 81 milliGRAM(s) Oral daily  clopidogrel Tablet 75 milliGRAM(s) Oral daily  folic acid 1 milliGRAM(s) Oral daily  heparin  Injectable 5000 Unit(s) SubCutaneous every 8 hours  insulin lispro (HumaLOG) corrective regimen sliding scale   SubCutaneous three times a day before meals  losartan 100 milliGRAM(s) Oral daily  multivitamin/minerals 1 Tablet(s) Oral daily  pantoprazole    Tablet 40 milliGRAM(s) Oral before breakfast  regadenoson Injectable 0.4 milliGRAM(s) IV Push once  simvastatin 20 milliGRAM(s) Oral at bedtime      Nuclear pharma stress test to better cardiac risk stratify.  Further details to be determined.    Mihcael Doan MD, FACC, NADEGE, MIRZA, FACP  Director, Heart Failure Services  [facility]  , Department of Cardiology  [employer] [facility] [employer]

## 2019-09-08 NOTE — PROGRESS NOTE ADULT - PROBLEM SELECTOR PLAN 1
Telemetry monitoring.  CE neg x 3  Aspirin daily  Plavix 75mg po qdaily.  Stress test in AM  TTE shows grade 1 diastolic dysfunction otherwise normal  Cardiology consult appreciated

## 2019-09-09 VITALS
RESPIRATION RATE: 16 BRPM | DIASTOLIC BLOOD PRESSURE: 67 MMHG | SYSTOLIC BLOOD PRESSURE: 124 MMHG | TEMPERATURE: 98 F | OXYGEN SATURATION: 98 % | HEART RATE: 55 BPM

## 2019-09-09 LAB
ANION GAP SERPL CALC-SCNC: 7 MMOL/L — SIGNIFICANT CHANGE UP (ref 5–17)
ANISOCYTOSIS BLD QL: SLIGHT — SIGNIFICANT CHANGE UP
BASOPHILS # BLD AUTO: 0.02 K/UL — SIGNIFICANT CHANGE UP (ref 0–0.2)
BASOPHILS NFR BLD AUTO: 0.4 % — SIGNIFICANT CHANGE UP (ref 0–2)
BUN SERPL-MCNC: 14 MG/DL — SIGNIFICANT CHANGE UP (ref 7–23)
CALCIUM SERPL-MCNC: 8.6 MG/DL — SIGNIFICANT CHANGE UP (ref 8.5–10.1)
CHLORIDE SERPL-SCNC: 108 MMOL/L — SIGNIFICANT CHANGE UP (ref 96–108)
CO2 SERPL-SCNC: 27 MMOL/L — SIGNIFICANT CHANGE UP (ref 22–31)
CREAT SERPL-MCNC: 0.79 MG/DL — SIGNIFICANT CHANGE UP (ref 0.5–1.3)
DACRYOCYTES BLD QL SMEAR: SLIGHT — SIGNIFICANT CHANGE UP
ELLIPTOCYTES BLD QL SMEAR: SLIGHT — SIGNIFICANT CHANGE UP
EOSINOPHIL # BLD AUTO: 0.08 K/UL — SIGNIFICANT CHANGE UP (ref 0–0.5)
EOSINOPHIL NFR BLD AUTO: 1.5 % — SIGNIFICANT CHANGE UP (ref 0–6)
GLUCOSE BLDC GLUCOMTR-MCNC: 133 MG/DL — HIGH (ref 70–99)
GLUCOSE BLDC GLUCOMTR-MCNC: 193 MG/DL — HIGH (ref 70–99)
GLUCOSE BLDC GLUCOMTR-MCNC: 252 MG/DL — HIGH (ref 70–99)
GLUCOSE SERPL-MCNC: 125 MG/DL — HIGH (ref 70–99)
HCT VFR BLD CALC: 35 % — SIGNIFICANT CHANGE UP (ref 34.5–45)
HGB BLD-MCNC: 10.8 G/DL — LOW (ref 11.5–15.5)
IMM GRANULOCYTES NFR BLD AUTO: 0.2 % — SIGNIFICANT CHANGE UP (ref 0–1.5)
LYMPHOCYTES # BLD AUTO: 2.12 K/UL — SIGNIFICANT CHANGE UP (ref 1–3.3)
LYMPHOCYTES # BLD AUTO: 40.3 % — SIGNIFICANT CHANGE UP (ref 13–44)
MANUAL SMEAR VERIFICATION: SIGNIFICANT CHANGE UP
MCHC RBC-ENTMCNC: 21 PG — LOW (ref 27–34)
MCHC RBC-ENTMCNC: 30.9 GM/DL — LOW (ref 32–36)
MCV RBC AUTO: 68.1 FL — LOW (ref 80–100)
MICROCYTES BLD QL: SLIGHT — SIGNIFICANT CHANGE UP
MONOCYTES # BLD AUTO: 0.71 K/UL — SIGNIFICANT CHANGE UP (ref 0–0.9)
MONOCYTES NFR BLD AUTO: 13.5 % — SIGNIFICANT CHANGE UP (ref 2–14)
NEUTROPHILS # BLD AUTO: 2.32 K/UL — SIGNIFICANT CHANGE UP (ref 1.8–7.4)
NEUTROPHILS NFR BLD AUTO: 44.1 % — SIGNIFICANT CHANGE UP (ref 43–77)
NRBC # BLD: 0 /100 WBCS — SIGNIFICANT CHANGE UP (ref 0–0)
OVALOCYTES BLD QL SMEAR: SLIGHT — SIGNIFICANT CHANGE UP
PLAT MORPH BLD: NORMAL — SIGNIFICANT CHANGE UP
PLATELET # BLD AUTO: 150 K/UL — SIGNIFICANT CHANGE UP (ref 150–400)
PLATELET COUNT - ESTIMATE: NORMAL — SIGNIFICANT CHANGE UP
POIKILOCYTOSIS BLD QL AUTO: SLIGHT — SIGNIFICANT CHANGE UP
POTASSIUM SERPL-MCNC: 4.1 MMOL/L — SIGNIFICANT CHANGE UP (ref 3.5–5.3)
POTASSIUM SERPL-SCNC: 4.1 MMOL/L — SIGNIFICANT CHANGE UP (ref 3.5–5.3)
RBC # BLD: 5.14 M/UL — SIGNIFICANT CHANGE UP (ref 3.8–5.2)
RBC # FLD: 16.2 % — HIGH (ref 10.3–14.5)
RBC BLD AUTO: ABNORMAL
SODIUM SERPL-SCNC: 142 MMOL/L — SIGNIFICANT CHANGE UP (ref 135–145)
WBC # BLD: 5.26 K/UL — SIGNIFICANT CHANGE UP (ref 3.8–10.5)
WBC # FLD AUTO: 5.26 K/UL — SIGNIFICANT CHANGE UP (ref 3.8–10.5)

## 2019-09-09 PROCEDURE — 99239 HOSP IP/OBS DSCHRG MGMT >30: CPT

## 2019-09-09 PROCEDURE — 78452 HT MUSCLE IMAGE SPECT MULT: CPT | Mod: 26

## 2019-09-09 PROCEDURE — 99232 SBSQ HOSP IP/OBS MODERATE 35: CPT

## 2019-09-09 RX ADMIN — Medication 1: at 16:38

## 2019-09-09 RX ADMIN — Medication 81 MILLIGRAM(S): at 13:17

## 2019-09-09 RX ADMIN — Medication 3: at 13:16

## 2019-09-09 RX ADMIN — INFLUENZA VIRUS VACCINE 0.5 MILLILITER(S): 15; 15; 15; 15 SUSPENSION INTRAMUSCULAR at 18:45

## 2019-09-09 RX ADMIN — Medication 1 TABLET(S): at 13:17

## 2019-09-09 RX ADMIN — CLOPIDOGREL BISULFATE 75 MILLIGRAM(S): 75 TABLET, FILM COATED ORAL at 13:17

## 2019-09-09 RX ADMIN — Medication 1 MILLIGRAM(S): at 13:17

## 2019-09-09 RX ADMIN — HEPARIN SODIUM 5000 UNIT(S): 5000 INJECTION INTRAVENOUS; SUBCUTANEOUS at 13:17

## 2019-09-09 RX ADMIN — REGADENOSON 0.4 MILLIGRAM(S): 0.08 INJECTION, SOLUTION INTRAVENOUS at 11:40

## 2019-09-09 RX ADMIN — HEPARIN SODIUM 5000 UNIT(S): 5000 INJECTION INTRAVENOUS; SUBCUTANEOUS at 07:23

## 2019-09-09 NOTE — PROGRESS NOTE ADULT - SUBJECTIVE AND OBJECTIVE BOX
Patient is a 79y old  Female who presents with a chief complaint of chest pain (08 Sep 2019 18:32)    PAST MEDICAL & SURGICAL HISTORY:  DM (diabetes mellitus)  HTN (hypertension)    INTERVAL HISTORY: resting in bed, not in any acute distress   	  MEDICATIONS:  MEDICATIONS  (STANDING):  aspirin  chewable 81 milliGRAM(s) Oral daily  clopidogrel Tablet 75 milliGRAM(s) Oral daily  folic acid 1 milliGRAM(s) Oral daily  heparin  Injectable 5000 Unit(s) SubCutaneous every 8 hours  influenza   Vaccine 0.5 milliLiter(s) IntraMuscular once  insulin lispro (HumaLOG) corrective regimen sliding scale   SubCutaneous three times a day before meals  losartan 100 milliGRAM(s) Oral daily  multivitamin/minerals 1 Tablet(s) Oral daily  pantoprazole    Tablet 40 milliGRAM(s) Oral before breakfast  simvastatin 20 milliGRAM(s) Oral at bedtime    MEDICATIONS  (PRN):  acetaminophen   Tablet .. 650 milliGRAM(s) Oral every 6 hours PRN Mild Pain (1 - 3)  dextrose 40% Gel 15 Gram(s) Oral once PRN Blood Glucose LESS THAN 70 milliGRAM(s)/deciliter  glucagon  Injectable 1 milliGRAM(s) IntraMuscular once PRN Glucose LESS THAN 70 milligrams/deciliter  nitroglycerin     SubLingual 0.4 milliGRAM(s) SubLingual every 5 minutes PRN Chest Pain    Vitals:  T(F): 97.6 (09-09-19 @ 13:14), Max: 98.4 (09-08-19 @ 16:25)  HR: 58 (09-09-19 @ 13:14) (50 - 58)  BP: 133/75 (09-09-19 @ 13:14) (127/56 - 150/88)  RR: 16 (09-09-19 @ 13:14) (16 - 18)  SpO2: 98% (09-09-19 @ 13:14) (96% - 98%)  Wt(kg): --56 kg    09-08 @ 07:01  -  09-09 @ 07:00  --------------------------------------------------------  IN:    Oral Fluid: 1020 mL  Total IN: 1020 mL    OUT:  Total OUT: 0 mL    Total NET: 1020 mL    PHYSICAL EXAM:  Neuro: Awake, responsive  CV: S1 S2 RRR  Lungs: CTABL  GI: Soft, BS +, ND, NT  Extremities: No edema    TELEMETRY: RSR    RADIOLOGY: < from: Xray Chest 1 View- PORTABLE-Urgent (09.06.19 @ 02:27) >  Lungs: The lungs are clear.  Heart: The heart is normal in size.  Mediastinum: The mediastinum is within normal limits.    IMPRESSION:    Clear lungs.    < end of copied text >    DIAGNOSTIC TESTING:    [ x] Echocardiogram: < from: TTE Echo Doppler w/o Cont (09.06.19 @ 14:35) >   1. Left ventricular ejection fraction, by visual estimation, is 60 to   65%.   2. Technically good study.   3. Normal global left ventricular systolic function.   4. Normal left ventricular internal cavity size.   5. Spectral Doppler shows impaired relaxation pattern of left   ventricular myocardial filling (Grade I diastolic dysfunction).   6. Normal right ventricular size and function.   7. There is noevidence of pericardial effusion.   8. Mild thickening and calcification of the anterior and posterior   mitral valve leaflets.   9. Mild aortic regurgitation.  10. Estimated pulmonary artery systolic pressure is 36.7 mmHg assuming a   right atrial pressure of 5 mmHg, which is consistent with borderline   pulmonary hypertension.    < end of copied text >    [P] Stress Test:      LABS:	 	    09 Sep 2019 06:17    142    |  108    |  14     ----------------------------<  125    4.1     |  27     |  0.79   08 Sep 2019 06:18    140    |  108    |  14     ----------------------------<  130    4.2     |  24     |  0.67   07 Sep 2019 06:53    139    |  103    |  12     ----------------------------<  120    3.6     |  27     |  0.69     Ca    8.6        09 Sep 2019 06:17                        10.8   5.26  )-----------( 150      ( 09 Sep 2019 06:17 )             35.0 ,                       10.6   4.83  )-----------( 169      ( 08 Sep 2019 06:18 )             34.3 ,                       12.4   5.90  )-----------( 173      ( 07 Sep 2019 06:53 )             41.5   HgA1c: Hemoglobin A1C, Whole Blood: 7.8 % (09-07 @ 11:03) Patient is a 79y old  Female who presents with a chief complaint of chest pain (08 Sep 2019 18:32)    PAST MEDICAL & SURGICAL HISTORY:  DM (diabetes mellitus)  HTN (hypertension)    INTERVAL HISTORY: resting in bed, not in any acute distress   	  MEDICATIONS:  MEDICATIONS  (STANDING):  aspirin  chewable 81 milliGRAM(s) Oral daily  clopidogrel Tablet 75 milliGRAM(s) Oral daily  folic acid 1 milliGRAM(s) Oral daily  heparin  Injectable 5000 Unit(s) SubCutaneous every 8 hours  influenza   Vaccine 0.5 milliLiter(s) IntraMuscular once  insulin lispro (HumaLOG) corrective regimen sliding scale   SubCutaneous three times a day before meals  losartan 100 milliGRAM(s) Oral daily  multivitamin/minerals 1 Tablet(s) Oral daily  pantoprazole    Tablet 40 milliGRAM(s) Oral before breakfast  simvastatin 20 milliGRAM(s) Oral at bedtime    MEDICATIONS  (PRN):  acetaminophen   Tablet .. 650 milliGRAM(s) Oral every 6 hours PRN Mild Pain (1 - 3)  dextrose 40% Gel 15 Gram(s) Oral once PRN Blood Glucose LESS THAN 70 milliGRAM(s)/deciliter  glucagon  Injectable 1 milliGRAM(s) IntraMuscular once PRN Glucose LESS THAN 70 milligrams/deciliter  nitroglycerin     SubLingual 0.4 milliGRAM(s) SubLingual every 5 minutes PRN Chest Pain    Vitals:  T(F): 97.6 (09-09-19 @ 13:14), Max: 98.4 (09-08-19 @ 16:25)  HR: 58 (09-09-19 @ 13:14) (50 - 58)  BP: 133/75 (09-09-19 @ 13:14) (127/56 - 150/88)  RR: 16 (09-09-19 @ 13:14) (16 - 18)  SpO2: 98% (09-09-19 @ 13:14) (96% - 98%)  Wt(kg): --56 kg    09-08 @ 07:01  -  09-09 @ 07:00  --------------------------------------------------------  IN:    Oral Fluid: 1020 mL  Total IN: 1020 mL    OUT:  Total OUT: 0 mL    Total NET: 1020 mL    PHYSICAL EXAM:  Neuro: Awake, responsive  CV: S1 S2 RRR  Lungs: CTABL  GI: Soft, BS +, ND, NT  Extremities: No edema    TELEMETRY: RSR    RADIOLOGY: < from: Xray Chest 1 View- PORTABLE-Urgent (09.06.19 @ 02:27) >  Lungs: The lungs are clear.  Heart: The heart is normal in size.  Mediastinum: The mediastinum is within normal limits.    IMPRESSION:    Clear lungs.    < end of copied text >    DIAGNOSTIC TESTING:    [ x] Echocardiogram: < from: TTE Echo Doppler w/o Cont (09.06.19 @ 14:35) >   1. Left ventricular ejection fraction, by visual estimation, is 60 to   65%.   2. Technically good study.   3. Normal global left ventricular systolic function.   4. Normal left ventricular internal cavity size.   5. Spectral Doppler shows impaired relaxation pattern of left   ventricular myocardial filling (Grade I diastolic dysfunction).   6. Normal right ventricular size and function.   7. There is noevidence of pericardial effusion.   8. Mild thickening and calcification of the anterior and posterior   mitral valve leaflets.   9. Mild aortic regurgitation.  10. Estimated pulmonary artery systolic pressure is 36.7 mmHg assuming a   right atrial pressure of 5 mmHg, which is consistent with borderline   pulmonary hypertension.    < end of copied text >    [x] Stress Test:   normal study    LABS:	 	    09 Sep 2019 06:17    142    |  108    |  14     ----------------------------<  125    4.1     |  27     |  0.79   08 Sep 2019 06:18    140    |  108    |  14     ----------------------------<  130    4.2     |  24     |  0.67   07 Sep 2019 06:53    139    |  103    |  12     ----------------------------<  120    3.6     |  27     |  0.69     Ca    8.6        09 Sep 2019 06:17                        10.8   5.26  )-----------( 150      ( 09 Sep 2019 06:17 )             35.0 ,                       10.6   4.83  )-----------( 169      ( 08 Sep 2019 06:18 )             34.3 ,                       12.4   5.90  )-----------( 173      ( 07 Sep 2019 06:53 )             41.5   HgA1c: Hemoglobin A1C, Whole Blood: 7.8 % (09-07 @ 11:03)

## 2019-09-09 NOTE — DISCHARGE NOTE NURSING/CASE MANAGEMENT/SOCIAL WORK - PATIENT PORTAL LINK FT
You can access the FollowMyHealth Patient Portal offered by [facility] by registering at the following website: [email or web address]. By joining [employer]’s FollowMyHealth portal, you will also be able to view your health information using other applications (apps) compatible with our system.

## 2019-09-09 NOTE — DISCHARGE NOTE PROVIDER - NSDCCPCAREPLAN_GEN_ALL_CORE_FT
PRINCIPAL DISCHARGE DIAGNOSIS  Diagnosis: Chest pain, unspecified type  Assessment and Plan of Treatment: Follow up with primary care doctor.

## 2019-09-09 NOTE — PROGRESS NOTE ADULT - ASSESSMENT
Patient is an 78 YO F with GERD, HTN, DM, and HLD who presents to the ED for medical eval.  Had substernal chest pains that are associated with activity.  Pain felt like a pressure in her chest.  Patient also reported associated lightheadedness and having these symptoms for some time.  Elevated BP at home with SBP ~ 190.  Stable labs, vitals, and clinical exam without evidence of ACS. Trop x3 neg   Ambulating and feeling better without cp. BP better.  Echo with normal EF, grade I DD, mild AR    PLAN:     cont with asa/ Plavix/statin   No bbl 2/2 HR in 50s  check TSH, lipid panel   cont with Losartan for BP control   diabetic management, on RISS  s/p Nuclear pharm stress test to better cardiac risk stratify, pending read Patient is an 78 YO F with GERD, HTN, DM, and HLD who presents to the ED for medical eval.  Had substernal chest pains that are associated with activity.  Pain felt like a pressure in her chest.  Patient also reported associated lightheadedness and having these symptoms for some time.  Elevated BP at home with SBP ~ 190.  Stable labs, vitals, and clinical exam without evidence of ACS. Trop x3 neg   Ambulating and feeling better without cp. BP better.  Echo with normal EF, grade I DD, mild AR    PLAN:     currently on  asa/ Plavix, on Plavix at home, unsure why  cont statin  No bbl 2/2 HR in 50s  check TSH, lipid panel   cont with Losartan for BP control   diabetic management, on RISS  s/p Nuclear pharm stress test: normal study, can dc asa Patient is an 78 YO F with GERD, HTN, DM, and HLD who presents to the ED for medical eval.  Had substernal chest pains that are associated with activity.  Pain felt like a pressure in her chest.  Patient also reported associated lightheadedness and having these symptoms for some time.  Elevated BP at home with SBP ~ 190.  Stable labs, vitals, and clinical exam without evidence of ACS. Trop x3 neg   Ambulating and feeling better without cp. BP better.  Echo with normal EF, grade I DD, mild AR    PLAN:     currently on  asa/ Plavix, on Plavix at home, unsure why  cont statin  No bbl 2/2 HR in 50s  check TSH, lipid panel   cont with Losartan for BP control   diabetic management, on RISS  s/p Nuclear pharm stress test: normal study, can d/c asa

## 2019-09-10 ENCOUNTER — EMERGENCY (EMERGENCY)
Facility: HOSPITAL | Age: 79
LOS: 0 days | Discharge: HOME | End: 2019-09-11
Attending: EMERGENCY MEDICINE
Payer: MEDICARE

## 2019-09-10 VITALS
OXYGEN SATURATION: 99 % | RESPIRATION RATE: 18 BRPM | SYSTOLIC BLOOD PRESSURE: 113 MMHG | TEMPERATURE: 98 F | WEIGHT: 139.99 LBS | HEART RATE: 58 BPM | DIASTOLIC BLOOD PRESSURE: 60 MMHG

## 2019-09-10 DIAGNOSIS — R07.9 CHEST PAIN, UNSPECIFIED: ICD-10-CM

## 2019-09-10 DIAGNOSIS — I10 ESSENTIAL (PRIMARY) HYPERTENSION: ICD-10-CM

## 2019-09-10 DIAGNOSIS — I95.9 HYPOTENSION, UNSPECIFIED: ICD-10-CM

## 2019-09-10 DIAGNOSIS — R42 DIZZINESS AND GIDDINESS: ICD-10-CM

## 2019-09-10 DIAGNOSIS — K21.9 GASTRO-ESOPHAGEAL REFLUX DISEASE WITHOUT ESOPHAGITIS: ICD-10-CM

## 2019-09-10 DIAGNOSIS — E11.9 TYPE 2 DIABETES MELLITUS WITHOUT COMPLICATIONS: ICD-10-CM

## 2019-09-10 LAB
ALBUMIN SERPL ELPH-MCNC: 3.5 G/DL — SIGNIFICANT CHANGE UP (ref 3.3–5)
ALP SERPL-CCNC: 49 U/L — SIGNIFICANT CHANGE UP (ref 40–120)
ALT FLD-CCNC: 53 U/L — SIGNIFICANT CHANGE UP (ref 12–78)
ANION GAP SERPL CALC-SCNC: 9 MMOL/L — SIGNIFICANT CHANGE UP (ref 5–17)
APPEARANCE UR: CLEAR — SIGNIFICANT CHANGE UP
APTT BLD: 27.5 SEC — LOW (ref 28.5–37)
AST SERPL-CCNC: 38 U/L — HIGH (ref 15–37)
BACTERIA # UR AUTO: NEGATIVE — SIGNIFICANT CHANGE UP
BASOPHILS # BLD AUTO: 0.02 K/UL — SIGNIFICANT CHANGE UP (ref 0–0.2)
BASOPHILS NFR BLD AUTO: 0.4 % — SIGNIFICANT CHANGE UP (ref 0–2)
BILIRUB SERPL-MCNC: 0.3 MG/DL — SIGNIFICANT CHANGE UP (ref 0.2–1.2)
BILIRUB UR-MCNC: NEGATIVE — SIGNIFICANT CHANGE UP
BUN SERPL-MCNC: 20 MG/DL — SIGNIFICANT CHANGE UP (ref 7–23)
CALCIUM SERPL-MCNC: 8.2 MG/DL — LOW (ref 8.5–10.1)
CHLORIDE SERPL-SCNC: 105 MMOL/L — SIGNIFICANT CHANGE UP (ref 96–108)
CO2 SERPL-SCNC: 24 MMOL/L — SIGNIFICANT CHANGE UP (ref 22–31)
COLOR SPEC: YELLOW — SIGNIFICANT CHANGE UP
CREAT SERPL-MCNC: 1.02 MG/DL — SIGNIFICANT CHANGE UP (ref 0.5–1.3)
DIFF PNL FLD: ABNORMAL
EOSINOPHIL # BLD AUTO: 0.1 K/UL — SIGNIFICANT CHANGE UP (ref 0–0.5)
EOSINOPHIL NFR BLD AUTO: 1.9 % — SIGNIFICANT CHANGE UP (ref 0–6)
EPI CELLS # UR: SIGNIFICANT CHANGE UP
GLUCOSE SERPL-MCNC: 155 MG/DL — HIGH (ref 70–99)
GLUCOSE UR QL: NEGATIVE MG/DL — SIGNIFICANT CHANGE UP
HCT VFR BLD CALC: 34 % — LOW (ref 34.5–45)
HGB BLD-MCNC: 10.4 G/DL — LOW (ref 11.5–15.5)
IMM GRANULOCYTES NFR BLD AUTO: 0.2 % — SIGNIFICANT CHANGE UP (ref 0–1.5)
INR BLD: 0.91 RATIO — SIGNIFICANT CHANGE UP (ref 0.88–1.16)
KETONES UR-MCNC: NEGATIVE — SIGNIFICANT CHANGE UP
LACTATE SERPL-SCNC: 1.8 MMOL/L — SIGNIFICANT CHANGE UP (ref 0.7–2)
LEUKOCYTE ESTERASE UR-ACNC: ABNORMAL
LYMPHOCYTES # BLD AUTO: 1.31 K/UL — SIGNIFICANT CHANGE UP (ref 1–3.3)
LYMPHOCYTES # BLD AUTO: 24.3 % — SIGNIFICANT CHANGE UP (ref 13–44)
MAGNESIUM SERPL-MCNC: 2.2 MG/DL — SIGNIFICANT CHANGE UP (ref 1.6–2.6)
MCHC RBC-ENTMCNC: 20.8 PG — LOW (ref 27–34)
MCHC RBC-ENTMCNC: 30.6 GM/DL — LOW (ref 32–36)
MCV RBC AUTO: 68 FL — LOW (ref 80–100)
MONOCYTES # BLD AUTO: 0.84 K/UL — SIGNIFICANT CHANGE UP (ref 0–0.9)
MONOCYTES NFR BLD AUTO: 15.6 % — HIGH (ref 2–14)
NEUTROPHILS # BLD AUTO: 3.1 K/UL — SIGNIFICANT CHANGE UP (ref 1.8–7.4)
NEUTROPHILS NFR BLD AUTO: 57.6 % — SIGNIFICANT CHANGE UP (ref 43–77)
NITRITE UR-MCNC: NEGATIVE — SIGNIFICANT CHANGE UP
NRBC # BLD: 0 /100 WBCS — SIGNIFICANT CHANGE UP (ref 0–0)
PH UR: 6 — SIGNIFICANT CHANGE UP (ref 5–8)
PLATELET # BLD AUTO: 150 K/UL — SIGNIFICANT CHANGE UP (ref 150–400)
POTASSIUM SERPL-MCNC: 4.1 MMOL/L — SIGNIFICANT CHANGE UP (ref 3.5–5.3)
POTASSIUM SERPL-SCNC: 4.1 MMOL/L — SIGNIFICANT CHANGE UP (ref 3.5–5.3)
PROT SERPL-MCNC: 6.9 GM/DL — SIGNIFICANT CHANGE UP (ref 6–8.3)
PROT UR-MCNC: NEGATIVE MG/DL — SIGNIFICANT CHANGE UP
PROTHROM AB SERPL-ACNC: 10.2 SEC — SIGNIFICANT CHANGE UP (ref 10–12.9)
RBC # BLD: 5 M/UL — SIGNIFICANT CHANGE UP (ref 3.8–5.2)
RBC # FLD: 16.4 % — HIGH (ref 10.3–14.5)
RBC CASTS # UR COMP ASSIST: SIGNIFICANT CHANGE UP /HPF (ref 0–4)
SODIUM SERPL-SCNC: 138 MMOL/L — SIGNIFICANT CHANGE UP (ref 135–145)
SP GR SPEC: 1 — LOW (ref 1.01–1.02)
TROPONIN I SERPL-MCNC: <.015 NG/ML — SIGNIFICANT CHANGE UP (ref 0.01–0.04)
UROBILINOGEN FLD QL: NEGATIVE MG/DL — SIGNIFICANT CHANGE UP
WBC # BLD: 5.38 K/UL — SIGNIFICANT CHANGE UP (ref 3.8–10.5)
WBC # FLD AUTO: 5.38 K/UL — SIGNIFICANT CHANGE UP (ref 3.8–10.5)
WBC UR QL: SIGNIFICANT CHANGE UP

## 2019-09-10 PROCEDURE — 93010 ELECTROCARDIOGRAM REPORT: CPT

## 2019-09-10 PROCEDURE — 99284 EMERGENCY DEPT VISIT MOD MDM: CPT

## 2019-09-10 PROCEDURE — 70450 CT HEAD/BRAIN W/O DYE: CPT | Mod: 26

## 2019-09-10 RX ORDER — CLOPIDOGREL BISULFATE 75 MG/1
1 TABLET, FILM COATED ORAL
Qty: 0 | Refills: 0 | DISCHARGE

## 2019-09-10 RX ORDER — GABAPENTIN 400 MG/1
0 CAPSULE ORAL
Qty: 0 | Refills: 0 | DISCHARGE

## 2019-09-10 RX ORDER — SIMVASTATIN 20 MG/1
1 TABLET, FILM COATED ORAL
Qty: 0 | Refills: 0 | DISCHARGE

## 2019-09-10 RX ORDER — MECLIZINE HCL 12.5 MG
25 TABLET ORAL ONCE
Refills: 0 | Status: COMPLETED | OUTPATIENT
Start: 2019-09-10 | End: 2019-09-10

## 2019-09-10 RX ORDER — MECLIZINE HCL 12.5 MG
1 TABLET ORAL
Qty: 0 | Refills: 0 | DISCHARGE

## 2019-09-10 RX ORDER — ESOMEPRAZOLE MAGNESIUM 40 MG/1
1 CAPSULE, DELAYED RELEASE ORAL
Qty: 0 | Refills: 0 | DISCHARGE

## 2019-09-10 RX ORDER — METFORMIN HYDROCHLORIDE 850 MG/1
1 TABLET ORAL
Qty: 0 | Refills: 0 | DISCHARGE

## 2019-09-10 RX ORDER — MULTIVIT-MIN/FERROUS GLUCONATE 9 MG/15 ML
1 LIQUID (ML) ORAL
Qty: 0 | Refills: 0 | DISCHARGE

## 2019-09-10 RX ORDER — SITAGLIPTIN 50 MG/1
1 TABLET, FILM COATED ORAL
Qty: 0 | Refills: 0 | DISCHARGE

## 2019-09-10 RX ORDER — LOSARTAN POTASSIUM 100 MG/1
1 TABLET, FILM COATED ORAL
Qty: 0 | Refills: 0 | DISCHARGE

## 2019-09-10 RX ORDER — FOLIC ACID 0.8 MG
1 TABLET ORAL
Qty: 0 | Refills: 0 | DISCHARGE

## 2019-09-10 RX ORDER — SODIUM CHLORIDE 9 MG/ML
500 INJECTION INTRAMUSCULAR; INTRAVENOUS; SUBCUTANEOUS ONCE
Refills: 0 | Status: COMPLETED | OUTPATIENT
Start: 2019-09-10 | End: 2019-09-10

## 2019-09-10 RX ADMIN — Medication 25 MILLIGRAM(S): at 21:21

## 2019-09-10 RX ADMIN — SODIUM CHLORIDE 500 MILLILITER(S): 9 INJECTION INTRAMUSCULAR; INTRAVENOUS; SUBCUTANEOUS at 21:21

## 2019-09-10 NOTE — ED ADULT TRIAGE NOTE - CHIEF COMPLAINT QUOTE
pt presents to the ED with c/o + chest discomfort dizziness was seen tx and released from the hospital last night, states at home she was hypotensive in triage normotensive

## 2019-09-10 NOTE — ED PROVIDER NOTE - PATIENT PORTAL LINK FT
You can access the FollowMyHealth Patient Portal offered by [facility] by registering at the following website: [email or web address]. By joining [employer]’s FollowMyHealth portal, you will also be able to view your health information using other applications (apps) compatible with our system.

## 2019-09-10 NOTE — ED PROVIDER NOTE - PHYSICAL EXAMINATION
Gen: Alert, NAD  Head: NC, AT, no nystagmus, EOMI, normal lids/conjunctiva  ENT: normal hearing, patent oropharynx, MMM  Neck: supple, no tenderness/meningismus, FROM, Trachea midline  Pulm: Bilateral clear BS, normal resp effort, no wheeze/stridor/retractions  CV: RRR, no M/R/G, +dist pulses  Abd: soft, NT/ND, +BS, no guarding/rebound tenderness  Mskel: no edema/erythema/cyanosis  Skin: no rash  Neuro: AAOx3, no sensory/motor deficits, CN 2-12 intact, NIHSS 0

## 2019-09-10 NOTE — ED PROVIDER NOTE - OBJECTIVE STATEMENT
Pertinent PMH/PSH/FHx/SHx and Review of Systems contained within:    78yo F w PMH of GERD, HTN, DM, and HLD  returns to ED c/o CP. Pertinent PMH/PSH/FHx/SHx and Review of Systems contained within:    78yo F w PMH of GERD, HTN, DM, HL returns to hospital for eval of dizziness & low BP.  Pt dc home yesterday, not sure if doses of meds were changed during admission.  Pt states she woke up feeling slightly light headed & dizzy, took her BP meds and had breakfast.  Pt was not feeling well, took BP, noted SBP 80s, DBP 40s.  Pt rested, then ate lunch, continued to not feel well w nausea, came to ED for eval.  Denies fever/chills, syncope, vision changes, vomiting, diarrhea.  Pt states she felt chest discomfort w nausea, currently resolved.  Pt reports sx exacerbated by going from sitting to standing/changing position.      No fever/chills, No photophobia/eye pain/changes in vision, No ear pain/sore throat/dysphagia, No chest pain/palpitations, no SOB/cough/wheeze/stridor, No abdominal pain, No neck/back pain, no rash, no changes in neurological status/function. Pertinent PMH/PSH/FHx/SHx and Review of Systems contained within:    78yo F w PMH of GERD, HTN, DM, HL returns to hospital for eval of dizziness & low BP.  Pt dc home yesterday, family states dose of losartan incr from 50mg to 100mg.  Pt states she woke up feeling slightly light headed & dizzy, took her BP meds and had breakfast.  Pt was not feeling well, took BP, noted SBP 80s, DBP 40s.  Pt rested, then ate lunch, continued to not feel well w nausea, came to ED for eval.  Denies fever/chills, syncope, vision changes, vomiting, diarrhea.  Pt states she felt chest discomfort w nausea, currently resolved.  Pt reports sx exacerbated by going from sitting to standing/changing position.      No fever/chills, No photophobia/eye pain/changes in vision, No ear pain/sore throat/dysphagia, No chest pain/palpitations, no SOB/cough/wheeze/stridor, No abdominal pain, No neck/back pain, no rash, no changes in neurological status/function.

## 2019-09-10 NOTE — ED PROVIDER NOTE - CLINICAL SUMMARY MEDICAL DECISION MAKING FREE TEXT BOX
Pt labs & imaging neg for acute pathology, sx resolved w small fluid bolus and meclizine.  Pt instructed to take BP prior to taking meds and given new script for 50mg losartan if BP decr.  Discussed results and outcome of testing with the patient, given copy as well.  Patient advised to please follow up with their primary care doctor within the next 24 hours and return to the Emergency Department for worsening symptoms or any other concerns.  Patient advised that their doctor may call  to follow up on the specific results of the tests performed today in the emergency department.

## 2019-09-10 NOTE — ED ADULT NURSE NOTE - NSIMPLEMENTINTERV_GEN_ALL_ED
Implemented All Fall Risk Interventions:  [place] to call system. Call bell, personal items and telephone within reach. Instruct patient to call for assistance. Room bathroom lighting operational. Non-slip footwear when patient is off stretcher. Physically safe environment: no spills, clutter or unnecessary equipment. Stretcher in lowest position, wheels locked, appropriate side rails in place. Provide visual cue, wrist band, yellow gown, etc. Monitor gait and stability. Monitor for mental status changes and reorient to person, place, and time. Review medications for side effects contributing to fall risk. Reinforce activity limits and safety measures with patient and family.

## 2019-09-11 VITALS
TEMPERATURE: 98 F | RESPIRATION RATE: 18 BRPM | HEART RATE: 58 BPM | OXYGEN SATURATION: 97 % | SYSTOLIC BLOOD PRESSURE: 111 MMHG | DIASTOLIC BLOOD PRESSURE: 62 MMHG

## 2019-09-11 DIAGNOSIS — Z79.4 LONG TERM (CURRENT) USE OF INSULIN: ICD-10-CM

## 2019-09-11 DIAGNOSIS — E11.9 TYPE 2 DIABETES MELLITUS WITHOUT COMPLICATIONS: ICD-10-CM

## 2019-09-11 DIAGNOSIS — E78.5 HYPERLIPIDEMIA, UNSPECIFIED: ICD-10-CM

## 2019-09-11 DIAGNOSIS — R07.9 CHEST PAIN, UNSPECIFIED: ICD-10-CM

## 2019-09-11 DIAGNOSIS — I10 ESSENTIAL (PRIMARY) HYPERTENSION: ICD-10-CM

## 2019-09-11 DIAGNOSIS — Z79.02 LONG TERM (CURRENT) USE OF ANTITHROMBOTICS/ANTIPLATELETS: ICD-10-CM

## 2019-09-11 DIAGNOSIS — R07.89 OTHER CHEST PAIN: ICD-10-CM

## 2019-09-11 DIAGNOSIS — K21.9 GASTRO-ESOPHAGEAL REFLUX DISEASE WITHOUT ESOPHAGITIS: ICD-10-CM

## 2019-09-11 LAB — TROPONIN I SERPL-MCNC: <.015 NG/ML — SIGNIFICANT CHANGE UP (ref 0.01–0.04)

## 2019-09-11 RX ORDER — LOSARTAN POTASSIUM 100 MG/1
1 TABLET, FILM COATED ORAL
Qty: 10 | Refills: 0
Start: 2019-09-11 | End: 2019-09-20

## 2019-09-12 LAB
CULTURE RESULTS: SIGNIFICANT CHANGE UP
SPECIMEN SOURCE: SIGNIFICANT CHANGE UP

## 2020-07-31 NOTE — ED ADULT NURSE NOTE - OBJECTIVE STATEMENT
pt presents to the ED with  family at bedside. [place] and cantonese speaking. family translating. pt with c/o + chest discomfort, dizziness was seen tx and released from the hospital last night,  per family pt was also hypotensive at home. PMH Diabetes
no

## 2020-11-19 NOTE — ED ADULT TRIAGE NOTE - TELEPHONIC ID NUMBER OF THE INTERPRETER
[id number]
Groin instructions:  No heavy lifting, driving, sex, tub baths, swimming, or any activity that submerges the lower half of the body in water for 48 hours.  Limited walking and stairs for 48 hours.    Change the bandaid after 24 hours and every 24 hours after that.  Keep the puncture site dry and covered with a bandaid until a scab forms.    Observe the site frequently.  If bleeding or a large lump (the size of a golf ball or bigger) occurs lie flat, apply continuous direct pressure just above the puncture site for at least 10 minutes, and notify your physician immediately.  If the bleeding cannot be controlled, call 911 immediately for assistance.  Notify your physician of pain, swelling or any drainage.    Notify your physician immediately if coldness, numbness, discoloration or pain in your foot occurs.    Please follow up with Dr. Moses by calling the CT Surgery office at [phone] on the next open business day to make an appointment. Anticipated plan for TAVR will be January 2021. The cardiac surgery office is located at [address] [place] [address], [address], [place], [place].

## 2022-07-19 NOTE — ED PROVIDER NOTE - TELEPHONIC ID NUMBER OF THE INTERPRETER
"Subjective:       Patient ID: Caryn Zarate is a 52 y.o. female.    Chief Complaint: Hospital Follow Up    ER follow-up    Had been sent to the ER because of sudden onset urinary and fecal incontinence in the context of severe back pain, concerning for cauda equina syndrome.  MRI was done emergently and this was not visualized.    "No evidence of acute fracture or traumatic listhesis of the lumbar spine.  No cauda equina type lesion in the lumbar spine.  Stable appearance of degenerative changes at the L4-5 and L5-S1 levels with no significant spinal canal or neural foraminal narrowing. Stable appearance of advanced facet disease at the L4-5 and L5-S1 levels."    Since that time, has been undergoing physical therapy.  Still has some pain but it does not as bad as it was and she has fewer limitations.  She is learning how to make position changes.  No bowel symptoms.  Chronic urinary incontinence for which she will be seeing Urology soon.    Patient Active Problem List:     Recurrent major depressive disorder, in partial remission     Sleep apnea: needs CPAP 10 per sleep study 6/14- declines tx; mild per HST 5/21     IFG (impaired fasting glucose)     Vitamin D deficiency disease     Fatty liver: see u/s 3/18: fibroscan 12/18 also 2020 F2S3     Renal cyst, left: see u/s 3/18     Pelvic floor dysfunction     Mixed hyperlipidemia     Decreased range of motion of trunk and back     Weakness of trunk musculature     Chronic midline low back pain without sciatica     Severe obesity (BMI 35.0-35.9 with comorbidity)     Impaired functional mobility and activity tolerance      Review of Systems   Gastrointestinal: Negative for abdominal distention and abdominal pain.   Genitourinary: Negative for difficulty urinating and flank pain.        See above   Musculoskeletal: Positive for back pain.       Objective:      Physical Exam  Constitutional:       Appearance: She is well-developed.   HENT:      Head: Normocephalic and " atraumatic.   Cardiovascular:      Rate and Rhythm: Normal rate and regular rhythm.      Heart sounds: No murmur heard.  Pulmonary:      Effort: Pulmonary effort is normal. No respiratory distress.      Breath sounds: Normal breath sounds. No wheezing.   Abdominal:      General: There is no distension.      Palpations: Abdomen is soft.   Musculoskeletal:      Cervical back: Normal range of motion and neck supple.      Comments: No CVA pain.  Negative SLR.  Strength UE and LE wnl.  No pain over spine   Skin:     General: Skin is warm and dry.   Neurological:      Mental Status: She is oriented to person, place, and time.      Cranial Nerves: No cranial nerve deficit.      Deep Tendon Reflexes: Reflexes normal.   Psychiatric:         Behavior: Behavior normal.         Assessment:       1. Chronic midline low back pain without sciatica        Plan:       Caryn was seen today for hospital follow up.    Diagnoses and all orders for this visit:    Chronic midline low back pain without sciatica    Other orders  -     methocarbamoL (ROBAXIN) 500 MG Tab; Take 1 tablet (500 mg total) by mouth 3 (three) times daily as needed (back pain/spasm).    Continue current therapy  Alarm symptoms and red flags reviewed  Shingles vaccine COVID booster recommended  Follow-up poor results         [id number]

## 2022-11-17 NOTE — DISCHARGE NOTE PROVIDER - DISCHARGE DATE
09-Sep-2019 Quality 130: Documentation Of Current Medications In The Medical Record: Current Medications Documented Detail Level: Detailed Quality 431: Preventive Care And Screening: Unhealthy Alcohol Use - Screening: Patient not identified as an unhealthy alcohol user when screened for unhealthy alcohol use using a systematic screening method Quality 110: Preventive Care And Screening: Influenza Immunization: Influenza immunization was not ordered or administered, reason not given Quality 226: Preventive Care And Screening: Tobacco Use: Screening And Cessation Intervention: Patient screened for tobacco use and is an ex/non-smoker

## 2023-10-16 NOTE — PATIENT PROFILE ADULT - NSPROMUTANXFEARADDRESSFT_GEN_A_NUR
Detail Level: Detailed Show Applicator Variable?: Yes Aperture Size (Optional): A Render Note In Bullet Format When Appropriate: No Post-Care Instructions: I reviewed with the patient in detail post-care instructions. Patient is to wear sunprotection, and avoid picking at any of the treated lesions. Pt may apply Vaseline to crusted or scabbing areas. Number Of Freeze-Thaw Cycles: 1 freeze-thaw cycle Duration Of Freeze Thaw-Cycle (Seconds): 3 Consent: The patient's consent was obtained including but not limited to risks of crusting, scabbing, blistering, scarring, darker or lighter pigmentary change, recurrence, incomplete removal and infection. review treatment plan

## 2024-01-25 NOTE — PATIENT PROFILE ADULT - NSPRONUTRITIONRISK_GEN_A_NUR
Addended by: LEORA DOMÍNGUEZ on: 1/25/2024 01:04 PM     Modules accepted: Orders     No indicators present